# Patient Record
Sex: MALE | Race: WHITE | NOT HISPANIC OR LATINO | Employment: FULL TIME | URBAN - METROPOLITAN AREA
[De-identification: names, ages, dates, MRNs, and addresses within clinical notes are randomized per-mention and may not be internally consistent; named-entity substitution may affect disease eponyms.]

---

## 2020-11-04 ENCOUNTER — OFFICE VISIT (OUTPATIENT)
Dept: MEDICAL GROUP | Facility: MEDICAL CENTER | Age: 45
End: 2020-11-04
Attending: FAMILY MEDICINE
Payer: COMMERCIAL

## 2020-11-04 VITALS
HEIGHT: 77 IN | RESPIRATION RATE: 16 BRPM | WEIGHT: 205.5 LBS | BODY MASS INDEX: 24.26 KG/M2 | DIASTOLIC BLOOD PRESSURE: 90 MMHG | SYSTOLIC BLOOD PRESSURE: 128 MMHG | OXYGEN SATURATION: 94 % | TEMPERATURE: 97.5 F | HEART RATE: 83 BPM

## 2020-11-04 DIAGNOSIS — K50.919 CROHN'S DISEASE WITH COMPLICATION, UNSPECIFIED GASTROINTESTINAL TRACT LOCATION (HCC): ICD-10-CM

## 2020-11-04 DIAGNOSIS — I10 ESSENTIAL HYPERTENSION: ICD-10-CM

## 2020-11-04 DIAGNOSIS — Z00.00 HEALTHCARE MAINTENANCE: ICD-10-CM

## 2020-11-04 DIAGNOSIS — Z23 NEED FOR VACCINATION: ICD-10-CM

## 2020-11-04 DIAGNOSIS — F32.2 CURRENT SEVERE EPISODE OF MAJOR DEPRESSIVE DISORDER WITHOUT PSYCHOTIC FEATURES WITHOUT PRIOR EPISODE (HCC): ICD-10-CM

## 2020-11-04 PROBLEM — K50.90 CROHN'S DISEASE (HCC): Status: ACTIVE | Noted: 2020-11-04

## 2020-11-04 PROCEDURE — 99203 OFFICE O/P NEW LOW 30 MIN: CPT | Mod: 25 | Performed by: FAMILY MEDICINE

## 2020-11-04 PROCEDURE — 99204 OFFICE O/P NEW MOD 45 MIN: CPT | Performed by: FAMILY MEDICINE

## 2020-11-04 PROCEDURE — 90686 IIV4 VACC NO PRSV 0.5 ML IM: CPT

## 2020-11-04 RX ORDER — TRAZODONE HYDROCHLORIDE 50 MG/1
50 TABLET ORAL NIGHTLY PRN
Qty: 30 TAB | Refills: 3 | Status: SHIPPED | OUTPATIENT
Start: 2020-11-04 | End: 2021-06-25

## 2020-11-04 RX ORDER — HYDROCHLOROTHIAZIDE 12.5 MG/1
12.5 TABLET ORAL DAILY
Qty: 30 TAB | Refills: 3 | Status: SHIPPED | OUTPATIENT
Start: 2020-11-04 | End: 2021-06-25

## 2020-11-04 RX ORDER — HYDROCHLOROTHIAZIDE 25 MG/1
25 TABLET ORAL DAILY
COMMUNITY
End: 2020-11-04 | Stop reason: SDUPTHER

## 2020-11-04 SDOH — HEALTH STABILITY: MENTAL HEALTH: HOW OFTEN DO YOU HAVE A DRINK CONTAINING ALCOHOL?: MONTHLY OR LESS

## 2020-11-04 SDOH — HEALTH STABILITY: MENTAL HEALTH: HOW MANY STANDARD DRINKS CONTAINING ALCOHOL DO YOU HAVE ON A TYPICAL DAY?: 1 OR 2

## 2020-11-04 SDOH — HEALTH STABILITY: MENTAL HEALTH: HOW OFTEN DO YOU HAVE 6 OR MORE DRINKS ON ONE OCCASION?: NEVER

## 2020-11-04 ASSESSMENT — PATIENT HEALTH QUESTIONNAIRE - PHQ9
CLINICAL INTERPRETATION OF PHQ2 SCORE: 5
SUM OF ALL RESPONSES TO PHQ QUESTIONS 1-9: 20
5. POOR APPETITE OR OVEREATING: 3 - NEARLY EVERY DAY

## 2020-11-04 NOTE — PROGRESS NOTES
"Subjective:     CC:    Chief Complaint   Patient presents with   • Hypertension   • Medication Refill   • Establish Care       HISTORY OF THE PRESENT ILLNESS: Patient is a 45 y.o. male. This pleasant patient is here today to establish care and discuss the following issues.   No prior PCP    Essential hypertension  [facility]  On HCTZ 25mg daily  Stopped taking it 1.5 years ago, hasn't had any measurements at home  Feels amped, like his heart is pounding  No headaches, no visual changes, no dizziness      Crohn's disease (HCC)  Was taking azacol, has tried humira  Last c-scope maybe 8 years ago  Has mild flares here and there, nothing that needed hospitalization    Current severe episode of major depressive disorder without psychotic features without prior episode (HCC)  - Today's PHQ: 20   However he marked that overactivity question, but he does report that he has ADHD and very hyperactive  - Symptoms: depressed mood, anhedonia, insomnia, feelings of worthlessness/guilt and difficulty concentrating  - Suicidal ideation/homicidal ideation: will talk about \"not making it\", and admits to being half joking, but doesn't actually think about it and has no plan  - Therapy/counseling: has done therapy before, does help  - Medication - has tried paxil, which helped before. Has also tried zoloft          Allergies: Patient has no known allergies.    Current Outpatient Medications Ordered in Epic   Medication Sig Dispense Refill   • traZODone (DESYREL) 50 MG Tab Take 1 Tab by mouth at bedtime as needed for Sleep. 30 Tab 3   • hydroCHLOROthiazide (HYDRODIURIL) 12.5 MG tablet Take 1 Tab by mouth every day. 30 Tab 3     No current Epic-ordered facility-administered medications on file.        Past Medical History:   Diagnosis Date   • ADHD    • Crohn's disease (HCC)    • Hypertension    • krones        Past Surgical History:   Procedure Laterality Date   • APPENDECTOMY     • COLON RESECTION     • OTHER ABDOMINAL " "SURGERY     • OTHER ORTHOPEDIC SURGERY     • RESECTION, PARTIAL SMALL BOWEL         Social History     Tobacco Use   • Smoking status: Never Smoker   • Smokeless tobacco: Never Used   Substance Use Topics   • Alcohol use: Yes     Frequency: Monthly or less     Drinks per session: 1 or 2     Binge frequency: Never     Comment: occ    • Drug use: No       Social History     Social History Narrative   • Not on file       Family History   Problem Relation Age of Onset   • No Known Problems Mother    • No Known Problems Father    • Cancer Neg Hx    • Diabetes Neg Hx    • Hypertension Neg Hx    • Hyperlipidemia Neg Hx    • Stroke Neg Hx    • Heart Disease Neg Hx        Health Maintenance: Completed    ROS:   Gen: no fevers/chills  Eyes: needs stronger glasses  ENT: no sore throat  Pulm: no sob, no cough  CV: small chest pain twinges , no palpitations  GI: no abd pain  : no dysuria, but does sometimes have urgency, sometimes will have incomplete emptying and urinate on himself after going to the bathroom  MSk: no myalgias  Skin: no rash  Neuro: no headaches  Heme/Lymph: no easy bruising      Objective:     Exam: /90   Pulse 83   Temp 36.4 °C (97.5 °F) (Temporal)   Resp 16   Ht 1.956 m (6' 5\")   Wt 93.2 kg (205 lb 8 oz)   SpO2 94%  Body mass index is 24.37 kg/m².    General: Normal appearing. No distress.  HEENT: Normocephalic. Eyes conjunctiva clear lids without ptosis, pupils equal and reactive to light accommodation, ears normal shape and contour, canals are clear bilaterally, tympanic membranes are benign, oropharynx is without erythema, edema or exudates.   Neck: Supple. Thyroid is not enlarged.  Pulmonary: Clear to ausculation.  Normal effort. No rales, ronchi, or wheezing.  Cardiovascular: Regular rate and rhythm without murmur. Radial pulses are intact and equal bilaterally.  Abdomen: Soft, nontender, nondistended. Normal bowel sounds.  Neurologic: Grossly nonfocal  Lymph: No cervical or " supraclavicular lymph nodes are palpable  Skin: Warm and dry.  No obvious lesions.  Musculoskeletal: Normal gait. No extremity cyanosis, clubbing, or edema.  Psych: Normal mood and affect. Alert and oriented x3. Judgment and insight is normal.      Labs: none to review     Assessment & Plan:   45 y.o. male with the following -    1. Crohn's disease with complication, unspecified gastrointestinal tract location (HCC)  - REFERRAL TO GASTROENTEROLOGY  And overall seems to be well controlled.  Should establish with a GI in case any complications arise.  Should have colonoscopy for surveillance.    2. Essential hypertension  - hydroCHLOROthiazide (HYDRODIURIL) 12.5 MG tablet; Take 1 Tab by mouth every day.  Dispense: 30 Tab; Refill: 3  His blood pressure is not terribly high, may come down with depression and anxiety medications.  However given his history, will start on low-dose HCTZ.  Patient to get a blood pressure cuff and will monitor at home    3. Current severe episode of major depressive disorder without psychotic features without prior episode (HCC)  - traZODone (DESYREL) 50 MG Tab; Take 1 Tab by mouth at bedtime as needed for Sleep.  Dispense: 30 Tab; Refill: 3  Moderate to severe depression, he is interested in having something that might help him sleep.  I counseled him on potential side effects and he expressed understanding.  I have advised him to use the silver there are some mental health services to try and get therapy and also see a psychiatrist evaluate for ADHD    4. Healthcare maintenance  - CBC WITH DIFFERENTIAL; Future  - Comp Metabolic Panel; Future  - HEMOGLOBIN A1C; Future  - Lipid Profile; Future    5. Need for vaccination  - Influenza Vaccine Quad Injection (PF)      Return in about 1 month (around 12/4/2020).    Please note that this dictation was created using voice recognition software. I have made every reasonable attempt to correct obvious errors, but I expect that there are errors of  grammar and possibly content that I did not discover before finalizing the note.

## 2020-11-04 NOTE — PROGRESS NOTES
Subjective:     CC:    Chief Complaint   Patient presents with   • Hypertension   • Medication Refill   • Establish Care       HISTORY OF THE PRESENT ILLNESS: Patient is a 45 y.o. male. This pleasant patient is here today to establish care and discuss the following issues.   His/her prior PCP was ***.    No problem-specific Assessment & Plan notes found for this encounter.    Pt is worried about learning disability    Allergies: Patient has no known allergies.    Current Outpatient Medications Ordered in Epic   Medication Sig Dispense Refill   • hydroCHLOROthiazide (HYDRODIURIL) 25 MG Tab Take 25 mg by mouth every day.     • HYDROCODONE-ACETAMINOPHEN 5-500 MG PO TABS Take 1-2 Tabs by mouth every 6 hours as needed for Mild Pain. 20 Each 0   • AMOXICILLIN-POT CLAVULANATE 875-125 MG PO TABS Take 1 Tab by mouth 2 times a day. 20 Each 0   • SULFAMETHOXAZOLE-TRIMETHOPRIM 800-160 MG PO TABS Take 1 Tab by mouth 2 times a day. 20 Tab 0   • HYDROCODONE-ACETAMINOPHEN 5-500 MG PO TABS Take 1-2 Tabs by mouth every four hours as needed for Mild Pain. 10 Each 0     No current [facility]-ordered facility-administered medications on file.        Past Medical History:   Diagnosis Date   • ADHD    • Crohn's disease (HCC)    • Hypertension    • krones        Past Surgical History:   Procedure Laterality Date   • APPENDECTOMY     • COLON RESECTION     • OTHER ABDOMINAL SURGERY     • OTHER ORTHOPEDIC SURGERY     • RESECTION, PARTIAL SMALL BOWEL         Social History     Tobacco Use   • Smoking status: Never Smoker   • Smokeless tobacco: Never Used   Substance Use Topics   • Alcohol use: Yes     Frequency: Monthly or less     Drinks per session: 1 or 2     Binge frequency: Never     Comment: occ    • Drug use: No       Social History     Social History Narrative   • Not on file       Family History   Problem Relation Age of Onset   • No Known Problems Mother    • No Known Problems Father    • Cancer Neg Hx    • Diabetes Neg Hx    •  "Hypertension Neg Hx    • Hyperlipidemia Neg Hx    • Stroke Neg Hx    • Heart Disease Neg Hx        Health Maintenance: {COMPLETED:257015}    ROS:   Gen: no fevers/chills  Eyes: no changes in vision  ENT: no sore throat  Pulm: no sob, no cough  CV: no chest pain, no palpitations  GI: no abd pain  : no dysuria, sometimes with urgency and incomplete emptying  MSk: no myalgias  Skin: no rash  Neuro: no headaches  Heme/Lymph: no easy bruising      Objective:   ***  Exam: /88 (BP Location: Left arm, Patient Position: Sitting, BP Cuff Size: Adult long)   Pulse 83   Temp 36.4 °C (97.5 °F) (Temporal)   Resp 16   Ht 1.956 m (6' 5\")   Wt 93.2 kg (205 lb 8 oz)   SpO2 94%  Body mass index is 24.37 kg/m².    General: Normal appearing. No distress.  HEENT: Normocephalic. Eyes conjunctiva clear lids without ptosis, pupils equal and reactive to light accommodation, ears normal shape and contour, canals are clear bilaterally, tympanic membranes are benign, nasal mucosa benign, oropharynx is without erythema, edema or exudates.   Neck: Supple. Thyroid is not enlarged.  Pulmonary: Clear to ausculation.  Normal effort. No rales, ronchi, or wheezing.  Cardiovascular: Regular rate and rhythm without murmur. Radial pulses are intact and equal bilaterally.  Abdomen: Soft, nontender, nondistended. Normal bowel sounds.  Neurologic: Grossly nonfocal  Lymph: No cervical or supraclavicular lymph nodes are palpable  Skin: Warm and dry.  No obvious lesions.  Musculoskeletal: Normal gait. No extremity cyanosis, clubbing, or edema.  Psych: Normal mood and affect. Alert and oriented x3. Judgment and insight is normal.  ***    Labs: ***    Assessment & Plan:   45 y.o. male with the following -    1. Crohn's disease with complication, unspecified gastrointestinal tract location (HCC)    2. Essential hypertension    Other orders  - hydroCHLOROthiazide (HYDRODIURIL) 25 MG Tab; Take 25 mg by mouth every day.      No follow-ups on " file.    Please note that this dictation was created using voice recognition software. I have made every reasonable attempt to correct obvious errors, but I expect that there are errors of grammar and possibly content that I did not discover before finalizing the note.

## 2020-11-04 NOTE — ASSESSMENT & PLAN NOTE
Was taking azacol, has tried humira  Last c-scope maybe 8 years ago  Has mild flares here and there, nothing that needed hospitalization

## 2020-11-04 NOTE — ASSESSMENT & PLAN NOTE
[facility] in [facility]  On HCTZ 25mg daily  Stopped taking it 1.5 years ago, hasn't had any measurements at home  Feels amped, like his heart is pounding  No headaches, no visual changes, no dizziness

## 2020-11-04 NOTE — ASSESSMENT & PLAN NOTE
"- Today's PHQ: 20   However he marked that overactivity question, but he does report that he has ADHD and very hyperactive  - Symptoms: depressed mood, anhedonia, insomnia, feelings of worthlessness/guilt and difficulty concentrating  - Suicidal ideation/homicidal ideation: will talk about \"not making it\", and admits to being half joking, but doesn't actually think about it and has no plan  - Therapy/counseling: has done therapy before, does help  - Medication - has tried paxil, which helped before. Has also tried zoloft      "

## 2021-07-16 ENCOUNTER — OFFICE VISIT (OUTPATIENT)
Dept: MEDICAL GROUP | Facility: MEDICAL CENTER | Age: 46
End: 2021-07-16
Attending: FAMILY MEDICINE
Payer: COMMERCIAL

## 2021-07-16 VITALS
TEMPERATURE: 97.2 F | BODY MASS INDEX: 25.74 KG/M2 | DIASTOLIC BLOOD PRESSURE: 88 MMHG | HEIGHT: 75 IN | RESPIRATION RATE: 20 BRPM | OXYGEN SATURATION: 94 % | SYSTOLIC BLOOD PRESSURE: 142 MMHG | HEART RATE: 78 BPM | WEIGHT: 207 LBS

## 2021-07-16 DIAGNOSIS — I10 ESSENTIAL HYPERTENSION: ICD-10-CM

## 2021-07-16 DIAGNOSIS — F32.2 CURRENT SEVERE EPISODE OF MAJOR DEPRESSIVE DISORDER WITHOUT PSYCHOTIC FEATURES WITHOUT PRIOR EPISODE (HCC): ICD-10-CM

## 2021-07-16 DIAGNOSIS — K50.919 CROHN'S DISEASE WITH COMPLICATION, UNSPECIFIED GASTROINTESTINAL TRACT LOCATION (HCC): ICD-10-CM

## 2021-07-16 PROCEDURE — 99214 OFFICE O/P EST MOD 30 MIN: CPT | Performed by: FAMILY MEDICINE

## 2021-07-16 PROCEDURE — 99213 OFFICE O/P EST LOW 20 MIN: CPT | Performed by: FAMILY MEDICINE

## 2021-07-16 RX ORDER — HYDROCHLOROTHIAZIDE 25 MG/1
25 TABLET ORAL DAILY
Qty: 30 TABLET | Refills: 2 | Status: SHIPPED | OUTPATIENT
Start: 2021-07-16 | End: 2021-12-13

## 2021-07-16 RX ORDER — TRAZODONE HYDROCHLORIDE 100 MG/1
100 TABLET ORAL
Qty: 30 TABLET | Refills: 2 | Status: SHIPPED | OUTPATIENT
Start: 2021-07-16 | End: 2021-08-20 | Stop reason: SDUPTHER

## 2021-07-16 ASSESSMENT — PATIENT HEALTH QUESTIONNAIRE - PHQ9
5. POOR APPETITE OR OVEREATING: 0 - NOT AT ALL
CLINICAL INTERPRETATION OF PHQ2 SCORE: 5
SUM OF ALL RESPONSES TO PHQ QUESTIONS 1-9: 15

## 2021-07-16 NOTE — PROGRESS NOTES
Subjective:     CC:   Chief Complaint   Patient presents with   • Follow-Up     depression         HPI:     Crohn's disease (HCC)  Has not set up care with GI.     Essential hypertension  BP mildly elevated today  He states there has been increased stressors at home.   Not taking his medications regularly but he does state he took it last 2 days     Current severe episode of major depressive disorder without psychotic features without prior episode (HCC)  - Medications: trazodone 50mg - he reports it helped slightly, get slightly more sleep  - Today's PHQ: 15 Last PHQ: 20  - Symptoms: insomnia  - Suicidal ideation/homicidal ideation: increased stressors, he does have thoughts but no plan, and he feels like he is at low likelihood of doing anything   - Therapy/counseling: never established   - Improving/worsening: worse due to home stressors    Depression Screening    Little interest or pleasure in doing things?  2 - more than half the days   Feeling down, depressed , or hopeless? 3 - nearly every day   Trouble falling or staying asleep, or sleeping too much?  3 - nearly every day   Feeling tired or having little energy?  2 - more than half the days   Poor appetite or overeating?  0 - not at all   Feeling bad about yourself - or that you are a failure or have let yourself or your family down? 3 - nearly every day   Trouble concentrating on things, such as reading the newspaper or watching television? 0 - not at all   Moving or speaking so slowly that other people could have noticed.  Or the opposite - being so fidgety or restless that you have been moving around a lot more than usual?  0 - not at all   Thoughts that you would be better off dead, or of hurting yourself?  2 - more than half the days   Patient Health Questionnaire Score: 15       If depressive symptoms identified deferred to follow up visit unless specifically addressed in assesment and plan.    Interpretation of PHQ-9 Total Score   Score Severity   1-4  "No Depression   5-9 Mild Depression   10-14 Moderate Depression   15-19 Moderately Severe Depression   20-27 Severe Depression          Past Medical History:   Diagnosis Date   • ADHD    • Crohn's disease (HCC)    • Hypertension    • krones        Social History     Tobacco Use   • Smoking status: Never Smoker   • Smokeless tobacco: Never Used   Vaping Use   • Vaping Use: Never used   Substance Use Topics   • Alcohol use: Yes     Comment: occ    • Drug use: No       Current Outpatient Medications Ordered in Epic   Medication Sig Dispense Refill   • traZODone (DESYREL) 100 MG Tab Take 1 tablet by mouth at bedtime. 30 tablet 2   • hydroCHLOROthiazide (HYDRODIURIL) 25 MG Tab Take 1 tablet by mouth every day. 30 tablet 2     No current Epic-ordered facility-administered medications on file.       Allergies:  Patient has no known allergies.    ROS:  Pulm: no sob, no cough  CV: no chest pain    Objective:       Exam:  /88 (BP Location: Left arm, Patient Position: Sitting, BP Cuff Size: Adult)   Pulse 78   Temp 36.2 °C (97.2 °F) (Temporal)   Resp 20   Ht 1.905 m (6' 3\")   Wt 93.9 kg (207 lb)   SpO2 94%   BMI 25.87 kg/m²  Body mass index is 25.87 kg/m².   BP recheck by me - 138/90    Gen: No apparent distress.  Lungs: Normal effort  CV: Regular rate and rhythm  Derm: Warm and dry. No visible rashes  Psy: Alert and oriented, Anxious mood and affect. Judgment normal      Labs:   None completed    Assessment & Plan:     46 y.o. male with the following -     1. Essential hypertension  - hydroCHLOROthiazide (HYDRODIURIL) 25 MG Tab; Take 1 tablet by mouth every day.  Dispense: 30 tablet; Refill: 2  Blood pressure is still mildly elevated, patient is not taking his blood pressure medications regularly, I did  him on doing this, however he did state that he was taking his meds for the last few days.  Given his still elevated blood pressure, will increase his dose to 25 mg.  He does state that he was previously " on this dose and worked well for him.    2. Current severe episode of major depressive disorder without psychotic features without prior episode (HCC)  - traZODone (DESYREL) 100 MG Tab; Take 1 tablet by mouth at bedtime.  Dispense: 30 tablet; Refill: 2  Patient states that he has been out for a few weeks now.  Am increasing his trazodone to 100 mg as he states he still having a difficult time sleeping even when he is on the medication.  He does state that it has been helping slightly with his mood.  [facility] referrals card was given to patient so that he can establish with talk therapy and/or psychiatry.    3. Crohn's disease with complication, unspecified gastrointestinal tract location (HCC)   again encourage patient to complete labs and Follow-up with gastroenterology.        Return in about 1 month (around 8/16/2021) for f/u depression, htn.    Please note that this dictation was created using voice recognition software. I have made every reasonable attempt to correct obvious errors, but I expect that there are errors of grammar and possibly content that I did not discover before finalizing the note.

## 2021-07-16 NOTE — ASSESSMENT & PLAN NOTE
BP mildly elevated today  He states there has been increased stressors at home.   Not taking his medications regularly but he does state he took it last 2 days

## 2021-07-16 NOTE — PATIENT INSTRUCTIONS
TO DO:  - complete labs  - call referral to gastroenterologist:  [facility]   [address] [facility] [place] [address]  Phone: [phone]  - call [facility] mental health referrals to get set up with a counselor

## 2021-07-16 NOTE — ASSESSMENT & PLAN NOTE
- Medications: trazodone 50mg - he reports it helped slightly, get slightly more sleep  - Today's PHQ: 15 Last PHQ: 20  - Symptoms: insomnia  - Suicidal ideation/homicidal ideation: increased stressors, he does have thoughts but no plan, and he feels like he is at low likelihood of doing anything   - Therapy/counseling: never established   - Improving/worsening: worse due to home stressors    Depression Screening    Little interest or pleasure in doing things?  2 - more than half the days   Feeling down, depressed , or hopeless? 3 - nearly every day   Trouble falling or staying asleep, or sleeping too much?  3 - nearly every day   Feeling tired or having little energy?  2 - more than half the days   Poor appetite or overeating?  0 - not at all   Feeling bad about yourself - or that you are a failure or have let yourself or your family down? 3 - nearly every day   Trouble concentrating on things, such as reading the newspaper or watching television? 0 - not at all   Moving or speaking so slowly that other people could have noticed.  Or the opposite - being so fidgety or restless that you have been moving around a lot more than usual?  0 - not at all   Thoughts that you would be better off dead, or of hurting yourself?  2 - more than half the days   Patient Health Questionnaire Score: 15       If depressive symptoms identified deferred to follow up visit unless specifically addressed in assesment and plan.    Interpretation of PHQ-9 Total Score   Score Severity   1-4 No Depression   5-9 Mild Depression   10-14 Moderate Depression   15-19 Moderately Severe Depression   20-27 Severe Depression

## 2021-08-20 ENCOUNTER — OFFICE VISIT (OUTPATIENT)
Dept: MEDICAL GROUP | Facility: MEDICAL CENTER | Age: 46
End: 2021-08-20
Attending: FAMILY MEDICINE
Payer: COMMERCIAL

## 2021-08-20 VITALS
DIASTOLIC BLOOD PRESSURE: 90 MMHG | SYSTOLIC BLOOD PRESSURE: 122 MMHG | HEART RATE: 102 BPM | BODY MASS INDEX: 25.57 KG/M2 | RESPIRATION RATE: 20 BRPM | OXYGEN SATURATION: 92 % | WEIGHT: 205.7 LBS | HEIGHT: 75 IN | TEMPERATURE: 98 F

## 2021-08-20 DIAGNOSIS — F32.2 CURRENT SEVERE EPISODE OF MAJOR DEPRESSIVE DISORDER WITHOUT PSYCHOTIC FEATURES WITHOUT PRIOR EPISODE (HCC): ICD-10-CM

## 2021-08-20 DIAGNOSIS — F41.1 GAD (GENERALIZED ANXIETY DISORDER): ICD-10-CM

## 2021-08-20 DIAGNOSIS — I10 ESSENTIAL HYPERTENSION: ICD-10-CM

## 2021-08-20 PROCEDURE — 99214 OFFICE O/P EST MOD 30 MIN: CPT | Performed by: FAMILY MEDICINE

## 2021-08-20 PROCEDURE — 99213 OFFICE O/P EST LOW 20 MIN: CPT | Performed by: FAMILY MEDICINE

## 2021-08-20 RX ORDER — TRAZODONE HYDROCHLORIDE 150 MG/1
150 TABLET ORAL
Qty: 30 TABLET | Refills: 3 | Status: SHIPPED | OUTPATIENT
Start: 2021-08-20 | End: 2022-04-12

## 2021-08-20 RX ORDER — PAROXETINE HYDROCHLORIDE 20 MG/1
20 TABLET, FILM COATED ORAL DAILY
Qty: 30 TABLET | Refills: 3 | Status: SHIPPED | OUTPATIENT
Start: 2021-08-20 | End: 2021-11-23

## 2021-08-20 RX ORDER — BUSPIRONE HYDROCHLORIDE 10 MG/1
TABLET ORAL
Qty: 60 TABLET | Refills: 3 | Status: SHIPPED | OUTPATIENT
Start: 2021-08-20 | End: 2021-11-23

## 2021-08-20 ASSESSMENT — ANXIETY QUESTIONNAIRES
7. FEELING AFRAID AS IF SOMETHING AWFUL MIGHT HAPPEN: NEARLY EVERY DAY
3. WORRYING TOO MUCH ABOUT DIFFERENT THINGS: NEARLY EVERY DAY
5. BEING SO RESTLESS THAT IT IS HARD TO SIT STILL: NEARLY EVERY DAY
6. BECOMING EASILY ANNOYED OR IRRITABLE: NEARLY EVERY DAY
2. NOT BEING ABLE TO STOP OR CONTROL WORRYING: NEARLY EVERY DAY
4. TROUBLE RELAXING: NEARLY EVERY DAY
1. FEELING NERVOUS, ANXIOUS, OR ON EDGE: NEARLY EVERY DAY
GAD7 TOTAL SCORE: 21

## 2021-08-20 ASSESSMENT — PATIENT HEALTH QUESTIONNAIRE - PHQ9
CLINICAL INTERPRETATION OF PHQ2 SCORE: 4
5. POOR APPETITE OR OVEREATING: 2 - MORE THAN HALF THE DAYS
SUM OF ALL RESPONSES TO PHQ QUESTIONS 1-9: 19

## 2021-08-20 NOTE — ASSESSMENT & PLAN NOTE
Anxiety has been making his bps higher, doesn't always check at home but does not normal blood pressures when he is calmer

## 2021-08-20 NOTE — ASSESSMENT & PLAN NOTE
Maybe zoloft, celexa, paxil in the past? He thinks he did well on paroxetine before   He states he is doing better overall, is having more good days than he did previously     Last phq9 = 15    Depression Screening    Little interest or pleasure in doing things?  2 - more than half the days   Feeling down, depressed , or hopeless? 2 - more than half the days   Trouble falling or staying asleep, or sleeping too much?  3 - nearly every day   Feeling tired or having little energy?  3 - nearly every day   Poor appetite or overeating?  2 - more than half the days   Feeling bad about yourself - or that you are a failure or have let yourself or your family down? 2 - more than half the days   Trouble concentrating on things, such as reading the newspaper or watching television? 2 - more than half the days   Moving or speaking so slowly that other people could have noticed.  Or the opposite - being so fidgety or restless that you have been moving around a lot more than usual?  3 - nearly every day   Thoughts that you would be better off dead, or of hurting yourself?  0 - not at all   Patient Health Questionnaire Score: 19       If depressive symptoms identified deferred to follow up visit unless specifically addressed in assesment and plan.    Interpretation of PHQ-9 Total Score   Score Severity   1-4 No Depression   5-9 Mild Depression   10-14 Moderate Depression   15-19 Moderately Severe Depression   20-27 Severe Depression

## 2021-08-20 NOTE — PROGRESS NOTES
Subjective:     CC:   Chief Complaint   Patient presents with   • Follow-Up     depression and BP         HPI:     Current severe episode of major depressive disorder without psychotic features without prior episode (HCC)  Maybe zoloft, celexa, paxil in the past? He thinks he did well on paroxetine before   He states he is doing better overall, is having more good days than he did previously     Last phq9 = 15    Depression Screening    Little interest or pleasure in doing things?  2 - more than half the days   Feeling down, depressed , or hopeless? 2 - more than half the days   Trouble falling or staying asleep, or sleeping too much?  3 - nearly every day   Feeling tired or having little energy?  3 - nearly every day   Poor appetite or overeating?  2 - more than half the days   Feeling bad about yourself - or that you are a failure or have let yourself or your family down? 2 - more than half the days   Trouble concentrating on things, such as reading the newspaper or watching television? 2 - more than half the days   Moving or speaking so slowly that other people could have noticed.  Or the opposite - being so fidgety or restless that you have been moving around a lot more than usual?  3 - nearly every day   Thoughts that you would be better off dead, or of hurting yourself?  0 - not at all   Patient Health Questionnaire Score: 19       If depressive symptoms identified deferred to follow up visit unless specifically addressed in assesment and plan.    Interpretation of PHQ-9 Total Score   Score Severity   1-4 No Depression   5-9 Mild Depression   10-14 Moderate Depression   15-19 Moderately Severe Depression   20-27 Severe Depression        SHEY (generalized anxiety disorder)  Severe anxiety  Trazodone didn't help too much, but he states he might be feeling slightly better. He states his mind races every night and can't fall asleep.  He works as a  but states that he doesn't feel like his work really  "affects his anxiety and just keeps him busy. Denies any traumatic experiences that have severely affected him, does not have flashbacks.     SHEY-7 Questionnaire    Feeling nervous, anxious, or on edge: Nearly every day  Not being able to sop or control worrying: Nearly every day  Worrying too much about different things: Nearly every day  Trouble relaxing: Nearly every day  Being so restless that it's hard to sit still: Nearly every day  Becoming easily annoyed or irritable: Nearly every day  Feeling afraid as if something awful might happen: Nearly every day  Total: 21    Interpretation of SHEY 7 Total Score   Score Severity :  0-4 No Anxiety   5-9 Mild Anxiety  10-14 Moderate Anxiety  15-21 Severe Anxiety        Essential hypertension  Anxiety has been making his bps higher, doesn't always check at home but does not normal blood pressures when he is calmer      Past Medical History:   Diagnosis Date   • ADHD    • Crohn's disease (HCC)    • Hypertension    • krones        Social History     Tobacco Use   • Smoking status: Never Smoker   • Smokeless tobacco: Never Used   Vaping Use   • Vaping Use: Never used   Substance Use Topics   • Alcohol use: Yes     Comment: occ    • Drug use: No       Current Outpatient Medications Ordered in Epic   Medication Sig Dispense Refill   • PARoxetine (PAXIL) 20 MG Tab Take 1 Tablet by mouth every day. 30 Tablet 3   • busPIRone (BUSPAR) 10 MG Tab tablet Start with 1/2 tab twice daily for 1 week, then increase to 1 tab twice daily 60 Tablet 3   • traZODone (DESYREL) 150 MG Tab Take 1 Tablet by mouth at bedtime. 30 Tablet 3   • hydroCHLOROthiazide (HYDRODIURIL) 25 MG Tab Take 1 tablet by mouth every day. 30 tablet 2     No current Epic-ordered facility-administered medications on file.       Allergies:  Patient has no known allergies.        Objective:       Exam:  /90   Pulse (!) 102   Temp 36.7 °C (98 °F) (Temporal)   Resp 20   Ht 1.905 m (6' 3\")   Wt 93.3 kg (205 lb 11.2 " oz)   SpO2 92%   BMI 25.71 kg/m²  Body mass index is 25.71 kg/m².    Constitutional: Alert, no distress, well-groomed. Hyperactive  Respiratory: Unlabored respiratory effort, no cough.  MSK: moves all extremities.  Psych: Alert and oriented x3, anxious affect and mood.          Labs:   None recent    Assessment & Plan:     46 y.o. male with the following -     1. Current severe episode of major depressive disorder without psychotic features without prior episode (HCC)  - PARoxetine (PAXIL) 20 MG Tab; Take 1 Tablet by mouth every day.  Dispense: 30 Tablet; Refill: 3  - busPIRone (BUSPAR) 10 MG Tab tablet; Start with 1/2 tab twice daily for 1 week, then increase to 1 tab twice daily  Dispense: 60 Tablet; Refill: 3  - traZODone (DESYREL) 150 MG Tab; Take 1 Tablet by mouth at bedtime.  Dispense: 30 Tablet; Refill: 3  Start on paxil, buspar, increase trazodone to 150mg.     2. SHEY (generalized anxiety disorder)  - PARoxetine (PAXIL) 20 MG Tab; Take 1 Tablet by mouth every day.  Dispense: 30 Tablet; Refill: 3  - busPIRone (BUSPAR) 10 MG Tab tablet; Start with 1/2 tab twice daily for 1 week, then increase to 1 tab twice daily  Dispense: 60 Tablet; Refill: 3  meds as above. He does report some history of ADHD - he will get eval'd by a psychiatrist who might initiate therapy. Given his pattern of speech and behavior, he may very well have adhd  And his treatment may improve his anxiety/depression. In the meantime starting on ssri and adjunctive anxiety meds. He is appreciative and willing to try something to feel better    3. Essential hypertension  bp improved during recheck, diastolic a little on the high side. Continue working on anxiety - if still high may need to add on additional meds.     Return in about 6 weeks (around 10/1/2021) for f/u depression/anxiety, blood pressure.    Please note that this dictation was created using voice recognition software. I have made every reasonable attempt to correct obvious errors,  but I expect that there are errors of grammar and possibly content that I did not discover before finalizing the note.

## 2021-08-20 NOTE — ASSESSMENT & PLAN NOTE
Severe anxiety  Trazodone didn't help too much, but he states he might be feeling slightly better. He states his mind races every night and can't fall asleep.  He works as a  but states that he doesn't feel like his work really affects his anxiety and just keeps him busy. Denies any traumatic experiences that have severely affected him, does not have flashbacks.     SHEY-7 Questionnaire    Feeling nervous, anxious, or on edge: Nearly every day  Not being able to sop or control worrying: Nearly every day  Worrying too much about different things: Nearly every day  Trouble relaxing: Nearly every day  Being so restless that it's hard to sit still: Nearly every day  Becoming easily annoyed or irritable: Nearly every day  Feeling afraid as if something awful might happen: Nearly every day  Total: 21    Interpretation of SHEY 7 Total Score   Score Severity :  0-4 No Anxiety   5-9 Mild Anxiety  10-14 Moderate Anxiety  15-21 Severe Anxiety

## 2021-11-09 ENCOUNTER — TELEPHONE (OUTPATIENT)
Dept: MEDICAL GROUP | Facility: MEDICAL CENTER | Age: 46
End: 2021-11-09

## 2021-11-09 DIAGNOSIS — U07.1 COVID-19: ICD-10-CM

## 2021-11-09 NOTE — TELEPHONE ENCOUNTER
1. Name: Mason Craig      Call Back Number: [phone]        How would the patient prefer to be contacted with a response: Phone call OK to leave a detailed message    2. Patient is requesting orders for Covid Test    3. Clinical Reason for Request: covid symptoms     4. Specialty & Provider Name/Lab/Imaging Location Preference: [facility] doing covid testing    5. Is appointment scheduled for requested order/referral: no    Patient was informed they may receive a return phone call from our office with any additional questions before processing this request.    Patient stated he took an at home Covid test 20 days ago and it came back positve. He tested again with the at home test and was still positive. Patient is now feeling better and only has fatigue but would like an order for a lab test instead. Please call patient for further questions.

## 2021-11-10 NOTE — TELEPHONE ENCOUNTER
Tried to reach patient but he does not have a voicemail on his phone.  I will order a antibody test which should be able to confirm that he likely did recently have a Covid infection.  The persistence of Covid virus on the nasal swab does not equal infectivity and if he has been 20 days now since he came down sick and at least 10 days since he had any fever he is not likely to be infectious and can and any ongoing self quarantine.  The antibody test can confirm for his peace of mind that he did have Covid and is developing separate Covid immunity on top of what he can get from receiving a Covid vaccine.  Lab order is sent to [facility].

## 2021-11-10 NOTE — TELEPHONE ENCOUNTER
Called patient and informed him the antibody Covid test was ordered and all he needs to do is make an appointment at the labs that are doing the test. Patient voiced understanding.

## 2021-11-23 ENCOUNTER — OFFICE VISIT (OUTPATIENT)
Dept: MEDICAL GROUP | Facility: MEDICAL CENTER | Age: 46
End: 2021-11-23
Attending: NURSE PRACTITIONER
Payer: COMMERCIAL

## 2021-11-23 VITALS
RESPIRATION RATE: 16 BRPM | OXYGEN SATURATION: 91 % | HEIGHT: 76 IN | DIASTOLIC BLOOD PRESSURE: 98 MMHG | WEIGHT: 207 LBS | BODY MASS INDEX: 25.21 KG/M2 | SYSTOLIC BLOOD PRESSURE: 154 MMHG | HEART RATE: 103 BPM | TEMPERATURE: 97.3 F

## 2021-11-23 DIAGNOSIS — F41.1 GAD (GENERALIZED ANXIETY DISORDER): ICD-10-CM

## 2021-11-23 DIAGNOSIS — I10 ESSENTIAL HYPERTENSION: ICD-10-CM

## 2021-11-23 DIAGNOSIS — F32.2 CURRENT SEVERE EPISODE OF MAJOR DEPRESSIVE DISORDER WITHOUT PSYCHOTIC FEATURES WITHOUT PRIOR EPISODE (HCC): ICD-10-CM

## 2021-11-23 PROCEDURE — 99212 OFFICE O/P EST SF 10 MIN: CPT | Performed by: NURSE PRACTITIONER

## 2021-11-23 PROCEDURE — 99214 OFFICE O/P EST MOD 30 MIN: CPT | Performed by: NURSE PRACTITIONER

## 2021-11-23 RX ORDER — PAROXETINE HYDROCHLORIDE 20 MG/1
30 TABLET, FILM COATED ORAL DAILY
Qty: 30 TABLET | Refills: 3 | Status: SHIPPED | OUTPATIENT
Start: 2021-11-23 | End: 2022-04-12

## 2021-11-23 RX ORDER — BUSPIRONE HYDROCHLORIDE 10 MG/1
10 TABLET ORAL 2 TIMES DAILY
Qty: 90 TABLET | Refills: 1 | Status: SHIPPED | OUTPATIENT
Start: 2021-11-23 | End: 2022-12-05 | Stop reason: SDUPTHER

## 2021-11-24 NOTE — PROGRESS NOTES
Chief Complaint   Patient presents with   • Follow-Up       Subjective:     HPI:   Mason Craig is a 46 y.o. male here to discuss the evaluation and management of:      Problem   Shey (Generalized Anxiety Disorder)    Patient states medications are not helping with his anxiety and he has trouble focusing or sitting still. He did not remember why he had an appointment today. States he has been taking the Paxil but never picked up the buspar.          ROS  See HPI     No Known Allergies    Current medicines (including changes today)  Current Outpatient Medications   Medication Sig Dispense Refill   • PARoxetine (PAXIL) 20 MG Tab Take 1.5 Tablets by mouth every day. 30 Tablet 3   • busPIRone (BUSPAR) 10 MG Tab tablet Take 1 Tablet by mouth 2 times a day. Start with 1/2 tab twice daily for 1 week, then increase to 1 tab twice daily 90 Tablet 1   • traZODone (DESYREL) 150 MG Tab Take 1 Tablet by mouth at bedtime. 30 Tablet 3   • hydroCHLOROthiazide (HYDRODIURIL) 25 MG Tab Take 1 tablet by mouth every day. 30 tablet 2     No current facility-administered medications for this visit.       Social History     Tobacco Use   • Smoking status: Never Smoker   • Smokeless tobacco: Never Used   Vaping Use   • Vaping Use: Never used   Substance Use Topics   • Alcohol use: Yes     Comment: occ    • Drug use: No       Patient Active Problem List    Diagnosis Date Noted   • SHEY (generalized anxiety disorder) 08/20/2021   • Crohn's disease (HCC) 11/04/2020   • Essential hypertension 11/04/2020   • Current severe episode of major depressive disorder without psychotic features without prior episode (HCC) 11/04/2020       Family History   Problem Relation Age of Onset   • No Known Problems Mother    • No Known Problems Father    • Cancer Neg Hx    • Diabetes Neg Hx    • Hypertension Neg Hx    • Hyperlipidemia Neg Hx    • Stroke Neg Hx    • Heart Disease Neg Hx           Objective:     /98 (BP Location: Left arm, Patient Position:  "Sitting, BP Cuff Size: Adult)   Pulse (!) 103   Temp 36.3 °C (97.3 °F) (Temporal)   Resp 16   Ht 1.93 m (6' 4\")   Wt 93.9 kg (207 lb)   SpO2 91%  Body mass index is 25.2 kg/m².    Physical Exam:  Physical Exam  Vitals reviewed.   Constitutional:       General: He is awake.      Appearance: He is overweight.      Comments: Fidgeting continuously unable to sit still    HENT:      Head: Normocephalic.   Eyes:      Conjunctiva/sclera: Conjunctivae normal.   Cardiovascular:      Rate and Rhythm: Regular rhythm. Tachycardia present.      Heart sounds: Normal heart sounds.   Pulmonary:      Effort: Pulmonary effort is normal. No respiratory distress.      Breath sounds: Normal breath sounds.   Musculoskeletal:      Cervical back: Neck supple.   Skin:     General: Skin is warm and dry.   Neurological:      Mental Status: He is alert and oriented to person, place, and time.   Psychiatric:         Attention and Perception: He is inattentive.         Mood and Affect: Mood is anxious.         Speech: Speech is rapid and pressured.         Behavior: Behavior is hyperactive. Behavior is cooperative.         Judgment: Judgment is impulsive.              Assessment and Plan:     The following treatment plan was discussed:    Problem List Items Addressed This Visit     Essential hypertension    Current severe episode of major depressive disorder without psychotic features without prior episode (HCC)    Relevant Medications    PARoxetine (PAXIL) 20 MG Tab    busPIRone (BUSPAR) 10 MG Tab tablet    SHEY (generalized anxiety disorder)     Patient visibly anxious/ manic, speech rapid and pressured, he is unable to sit still  Referral to Psychiatry to help with anxiety   Increased Paxil to 30 mg   Re-ordered buspar for patient to initiate   Follow up in 6 weeks            Relevant Medications    PARoxetine (PAXIL) 20 MG Tab    busPIRone (BUSPAR) 10 MG Tab tablet    Other Relevant Orders    Referral to Psychiatry          Any change " or worsening of signs or symptoms, patient encouraged to follow-up or report to emergency room for further evaluation. Patient verbalizes understanding and agrees.    Follow-Up: Return in about 6 weeks (around 1/4/2022).      PLEASE NOTE: This dictation was created using voice recognition software. I have made every reasonable attempt to correct obvious errors, but I expect that there are errors of grammar and possibly content that I did not discover before finalizing the note.

## 2021-11-24 NOTE — ASSESSMENT & PLAN NOTE
Patient visibly anxious/ manic, speech rapid and pressured, he is unable to sit still  Referral to Psychiatry to help with anxiety   Increased Paxil to 30 mg   Re-ordered buspar for patient to initiate   Follow up in 6 weeks

## 2021-12-12 DIAGNOSIS — I10 ESSENTIAL HYPERTENSION: ICD-10-CM

## 2021-12-13 RX ORDER — HYDROCHLOROTHIAZIDE 25 MG/1
TABLET ORAL
Qty: 30 TABLET | Refills: 0 | Status: SHIPPED | OUTPATIENT
Start: 2021-12-13 | End: 2022-02-01

## 2021-12-14 DIAGNOSIS — F32.2 CURRENT SEVERE EPISODE OF MAJOR DEPRESSIVE DISORDER WITHOUT PSYCHOTIC FEATURES WITHOUT PRIOR EPISODE (HCC): ICD-10-CM

## 2021-12-15 ENCOUNTER — PATIENT MESSAGE (OUTPATIENT)
Dept: MEDICAL GROUP | Facility: MEDICAL CENTER | Age: 46
End: 2021-12-15

## 2021-12-15 RX ORDER — TRAZODONE HYDROCHLORIDE 150 MG/1
150 TABLET ORAL
Qty: 30 TABLET | Refills: 3 | OUTPATIENT
Start: 2021-12-15

## 2021-12-15 NOTE — TELEPHONE ENCOUNTER
Group Topic: [place]    Date: 8/13/2020  Start Time: 10:10 AM  End Time: 11:00 AM  Facilitators: Alisha Pittman OT    Focus: Pt. able to draw self as a tree and discuss how trees relate to life situations currently  Number in attendance: 9    Method: Group  Attendance: Present  Participation: Active  Patient Response: Appropriate feedback, Attentive and Verbal  Mood: Anxious and Depressed  Affect: Type: Anxious   Range: Restricted   Congruency: Congruent   Stability: Stable  Behavior/Socialization: Appropriate to group, Cooperative and Supportive  Thought Process: Demonstrated insight, Easily distracted, Racing thoughts and Ruminating  Task Performance: Follows directions and Needs clarification  Patient Evaluation: Independent - full participation   This visit was performed via live interactive two-way video.    Clinician Location: [facility]    Patient Location: Home  Patient verbally consented to video visit.    VOICEMAIL  1. Caller Name: Mason Craig                        Call Back Number: [phone] (home)       2. Message: Patient said he is out of his trazodone. That when he saw Ruthy Rose she increased him to take 200 mg so he has been taking 1.5 tablets and he is out of refills and out of his medication taking that. Please refill for him at the 200mg dose.    3. Patient approves office to leave a detailed voicemail/[employer] message: yes    Received request via: Patient    Was the patient seen in the last year in this department? Yes    Does the patient have an active prescription (recently filled or refills available) for medication(s) requested? No

## 2021-12-16 NOTE — PATIENT COMMUNICATION
VOICEMAIL  1. Caller Name: Mason Craig                        Call Back Number: [phone] (home)       2. Message: He needs his trazodone, he is completely out.    3. Patient approves office to leave a detailed voicemail/MyChart message: yes    Mason says maybe he got the doses and medication mixed up when he see Ruthy. That he is out of the medication and it helps him sleep so he needs it. He is ok taking the dose ordered without an increase that he thought he was supposed to take. Informed him he had 3 refills if he did not pick them up and he said he doesn't think so and he will reach out to the pharmacy to .

## 2022-08-09 ENCOUNTER — TELEPHONE (OUTPATIENT)
Dept: MEDICAL GROUP | Facility: MEDICAL CENTER | Age: 47
End: 2022-08-09
Payer: COMMERCIAL

## 2022-08-09 NOTE — TELEPHONE ENCOUNTER
VOICEMAIL  1. Caller Name: Mason                      Call Back Number: [phone] (home)       2. Message: Patient called to request medication refills.     [place] is [employer] and was notified back in April that he would need to establish with a new PCP. Pt was also notified that Dr. Brcue would send a 90 day supply of meds over in April while he looked for new provider.     I attempted calling him back but [facility] was not set up.     3. Patient approves office to leave a detailed voicemail/MyChart message: yes

## 2022-08-11 ENCOUNTER — TELEPHONE (OUTPATIENT)
Dept: MEDICAL GROUP | Facility: MEDICAL CENTER | Age: 47
End: 2022-08-11

## 2022-08-11 NOTE — TELEPHONE ENCOUNTER
Caller Name: Mason  Call Back Number: [phone] (home)       How would the patient prefer to be contacted with a response: Phone call OK to leave a detailed message    Patient called to say he was completely out of medications, and needed refills. See encounter from 8/9/22 for more details.     He stated that he switched to [employer], however after scheduling him for an appointment and running insurance, pt still has active [place].     Please advise on whether or not meds can be refilled.

## 2022-08-12 DIAGNOSIS — F41.1 GAD (GENERALIZED ANXIETY DISORDER): ICD-10-CM

## 2022-08-12 DIAGNOSIS — F32.2 CURRENT SEVERE EPISODE OF MAJOR DEPRESSIVE DISORDER WITHOUT PSYCHOTIC FEATURES WITHOUT PRIOR EPISODE (HCC): ICD-10-CM

## 2022-08-12 DIAGNOSIS — I10 ESSENTIAL HYPERTENSION: ICD-10-CM

## 2022-08-12 RX ORDER — HYDROCHLOROTHIAZIDE 25 MG/1
25 TABLET ORAL DAILY
Qty: 60 TABLET | Refills: 0 | Status: SHIPPED | OUTPATIENT
Start: 2022-08-12 | End: 2022-12-05 | Stop reason: SDUPTHER

## 2022-08-12 RX ORDER — TRAZODONE HYDROCHLORIDE 150 MG/1
150 TABLET ORAL
Qty: 60 TABLET | Refills: 0 | Status: SHIPPED | OUTPATIENT
Start: 2022-08-12 | End: 2022-12-05 | Stop reason: SDUPTHER

## 2022-08-12 RX ORDER — PAROXETINE HYDROCHLORIDE 20 MG/1
20 TABLET, FILM COATED ORAL DAILY
Qty: 60 TABLET | Refills: 0 | Status: SHIPPED | OUTPATIENT
Start: 2022-08-12 | End: 2022-12-05 | Stop reason: SDUPTHER

## 2022-08-12 NOTE — PROGRESS NOTES
Patient no longer has a form of insurance that we accept in our office.   Last seen 11/2021 with elevated Bps. Has been on HCTZ. Notes state he did not  buspirone.  He was given 3 mon warning in 04/2022  I am providing only 2 more months of medication - he can no longer receive refills from me and must establish care with a new provider.

## 2022-12-05 ENCOUNTER — OFFICE VISIT (OUTPATIENT)
Dept: INTERNAL MEDICINE | Facility: OTHER | Age: 47
End: 2022-12-05
Payer: COMMERCIAL

## 2022-12-05 VITALS
TEMPERATURE: 98.1 F | BODY MASS INDEX: 28.62 KG/M2 | OXYGEN SATURATION: 96 % | HEART RATE: 83 BPM | SYSTOLIC BLOOD PRESSURE: 182 MMHG | WEIGHT: 235 LBS | HEIGHT: 76 IN | DIASTOLIC BLOOD PRESSURE: 66 MMHG

## 2022-12-05 DIAGNOSIS — G47.00 INSOMNIA, UNSPECIFIED TYPE: ICD-10-CM

## 2022-12-05 DIAGNOSIS — M62.838 MUSCLE SPASM: ICD-10-CM

## 2022-12-05 DIAGNOSIS — Z00.00 ENCOUNTER FOR ROUTINE ADULT HEALTH EXAMINATION WITHOUT ABNORMAL FINDINGS: ICD-10-CM

## 2022-12-05 DIAGNOSIS — F41.1 GAD (GENERALIZED ANXIETY DISORDER): ICD-10-CM

## 2022-12-05 DIAGNOSIS — K50.919 CROHN'S DISEASE WITH COMPLICATION, UNSPECIFIED GASTROINTESTINAL TRACT LOCATION (HCC): ICD-10-CM

## 2022-12-05 DIAGNOSIS — I10 ESSENTIAL HYPERTENSION: ICD-10-CM

## 2022-12-05 DIAGNOSIS — F32.2 CURRENT SEVERE EPISODE OF MAJOR DEPRESSIVE DISORDER WITHOUT PSYCHOTIC FEATURES WITHOUT PRIOR EPISODE (HCC): ICD-10-CM

## 2022-12-05 PROCEDURE — 99204 OFFICE O/P NEW MOD 45 MIN: CPT | Mod: GC | Performed by: STUDENT IN AN ORGANIZED HEALTH CARE EDUCATION/TRAINING PROGRAM

## 2022-12-05 RX ORDER — BUSPIRONE HYDROCHLORIDE 10 MG/1
10 TABLET ORAL 2 TIMES DAILY
Qty: 90 TABLET | Refills: 1 | Status: SHIPPED | OUTPATIENT
Start: 2022-12-05 | End: 2023-04-14

## 2022-12-05 RX ORDER — CYCLOBENZAPRINE HCL 5 MG
5 TABLET ORAL 2 TIMES DAILY PRN
Qty: 30 TABLET | Refills: 0 | Status: SHIPPED | OUTPATIENT
Start: 2022-12-05

## 2022-12-05 RX ORDER — NEBULIZER AND COMPRESSOR
1 EACH MISCELLANEOUS 2 TIMES DAILY PRN
Qty: 1 KIT | Refills: 0 | Status: SHIPPED | OUTPATIENT
Start: 2022-12-05 | End: 2024-02-06

## 2022-12-05 RX ORDER — PAROXETINE HYDROCHLORIDE 20 MG/1
20 TABLET, FILM COATED ORAL DAILY
Qty: 60 TABLET | Refills: 0 | Status: SHIPPED | OUTPATIENT
Start: 2022-12-05 | End: 2023-01-30 | Stop reason: SDUPTHER

## 2022-12-05 RX ORDER — TRAZODONE HYDROCHLORIDE 150 MG/1
150 TABLET ORAL
Qty: 60 TABLET | Refills: 0 | Status: SHIPPED | OUTPATIENT
Start: 2022-12-05 | End: 2023-04-18 | Stop reason: SDUPTHER

## 2022-12-05 RX ORDER — HYDROCHLOROTHIAZIDE 25 MG/1
25 TABLET ORAL DAILY
Qty: 60 TABLET | Refills: 0 | Status: SHIPPED | OUTPATIENT
Start: 2022-12-05 | End: 2022-12-14

## 2022-12-05 ASSESSMENT — ENCOUNTER SYMPTOMS
CHILLS: 0
LOSS OF CONSCIOUSNESS: 0
DOUBLE VISION: 0
WHEEZING: 0
HEMOPTYSIS: 0
SINUS PAIN: 0
BLURRED VISION: 0
COUGH: 0
NERVOUS/ANXIOUS: 1
PALPITATIONS: 0
DEPRESSION: 1
SEIZURES: 0
DIARRHEA: 0
SHORTNESS OF BREATH: 0
CONSTIPATION: 0
CLAUDICATION: 0
SORE THROAT: 0
FOCAL WEAKNESS: 0
HEADACHES: 0
ABDOMINAL PAIN: 0
BLOOD IN STOOL: 0
SPUTUM PRODUCTION: 0
FALLS: 0
INSOMNIA: 1
NAUSEA: 0
VOMITING: 0
FEVER: 0
DIZZINESS: 0
BACK PAIN: 1
MYALGIAS: 0
HEARTBURN: 0
ORTHOPNEA: 0
WEIGHT LOSS: 0

## 2022-12-05 ASSESSMENT — PATIENT HEALTH QUESTIONNAIRE - PHQ9
1. LITTLE INTEREST OR PLEASURE IN DOING THINGS: SEVERAL DAYS
7. TROUBLE CONCENTRATING ON THINGS, SUCH AS READING THE NEWSPAPER OR WATCHING TELEVISION: NOT AT ALL
SUM OF ALL RESPONSES TO PHQ QUESTIONS 1-9: 2
SUM OF ALL RESPONSES TO PHQ9 QUESTIONS 1 AND 2: 2
2. FEELING DOWN, DEPRESSED, IRRITABLE, OR HOPELESS: SEVERAL DAYS
4. FEELING TIRED OR HAVING LITTLE ENERGY: NOT AT ALL
9. THOUGHTS THAT YOU WOULD BE BETTER OFF DEAD, OR OF HURTING YOURSELF: NOT AT ALL
6. FEELING BAD ABOUT YOURSELF - OR THAT YOU ARE A FAILURE OR HAVE LET YOURSELF OR YOUR FAMILY DOWN: NOT AL ALL
8. MOVING OR SPEAKING SO SLOWLY THAT OTHER PEOPLE COULD HAVE NOTICED. OR THE OPPOSITE, BEING SO FIGETY OR RESTLESS THAT YOU HAVE BEEN MOVING AROUND A LOT MORE THAN USUAL: NOT AT ALL
5. POOR APPETITE OR OVEREATING: NOT AT ALL
3. TROUBLE FALLING OR STAYING ASLEEP OR SLEEPING TOO MUCH: NOT AT ALL

## 2022-12-05 NOTE — ASSESSMENT & PLAN NOTE
-Has not been taking his HCTZ 25mg medication for 3 months  -Blood Pressure systolics 180s and on repeat in office today  -Provided with blood pressure log and prescription for BP Cuff  -Advised to record blood pressures twice daily until next visit, to optimize blood pressure medication regimen if indicated

## 2022-12-05 NOTE — ASSESSMENT & PLAN NOTE
-Lost to follow up with GI  -Not currently on any medications for Crohn's  -Endorsed infusions exacerbated his symptoms of anxiety and depression  -No recent flares; denies fever, chills, nausea, vomiting, diarrhea, hematochezia or melena  -Provided with referral to outpatient GI

## 2022-12-05 NOTE — ASSESSMENT & PLAN NOTE
-Provided with Flexeril to help with his back pain to be used sparingly only as needed in the evening

## 2022-12-05 NOTE — PATIENT INSTRUCTIONS
-Please follow up with your outpatient labwork  -Please follow up with your outpatient GI referral  -Please return to our office next week to review your labwork and BP log

## 2022-12-05 NOTE — LETTER
[employer] [facility]  Ruby Bruce M.D.  [address]  [place] [address]  Fax: [phone]   Authorization for Release/Disclosure of   Protected Health Information   Name: PAOLO ROCHE : 1975 SSN: [id number]   Address: [address] [place] [address] Phone:    [phone] (home)    I authorize the entity listed below to release/disclose the PHI below to:   [facility]/Ruby Bruce M.D. and Javier Bethea M.D.   Provider or Entity Name:  [facility]   Address   [facility], [address]            [address] [place] [address] Phone:  [phone]      Fax:       [phone]          Reason for request: continuity of care   Information to be released:    [ X ] LAST COLONOSCOPY,  including any PATH REPORT and follow-up  [ X ] LAST FIT/COLOGUARD RESULT [  ] LAST DEXA  [  ] LAST MAMMOGRAM  [  ] LAST PAP  [  ] LAST LABS [  ] RETINA EXAM REPORT  [  ] IMMUNIZATION RECORDS  [  ] Release all info      [  ] Check here and initial the line next to each item to release ALL health information INCLUDING  _____ Care and treatment for drug and / or alcohol abuse  _____ HIV testing, infection status, or AIDS  _____ Genetic Testing    DATES OF SERVICE OR TIME PERIOD TO BE DISCLOSED: _____________  I understand and acknowledge that:  * This Authorization may be revoked at any time by you in writing, except if your health information has already been used or disclosed.  * Your health information that will be used or disclosed as a result of you signing this authorization could be re-disclosed by the recipient. If this occurs, your re-disclosed health information may no longer be protected by State or Federal laws.  * You may refuse to sign this Authorization. Your refusal will not affect your ability to obtain treatment.  * This Authorization becomes effective upon signing and will  on (date) __________.      If no date is indicated, this Authorization will  one (1) year from the signature date.     Name: Mason Craig    Signature:   Date:     12/5/2022       PLEASE FAX REQUESTED RECORDS BACK TO: [phone]

## 2022-12-05 NOTE — ASSESSMENT & PLAN NOTE
-Has not been taking his medication regimen for 3 months  -Lives at home with his female partner  -Refilled prescription for Buspar and Paroxetine  -Referred to outpatient Psychiatry  -Patient to follow up with our clinic later next week

## 2022-12-05 NOTE — PROGRESS NOTES
"     New Patient    Chief Complaint   Patient presents with    Establish Care    Medication Refill       HISTORY OF PRESENT ILLNESS:     Mr. Mason Craig is our 47 year old male patient with a past medical history that is significant for crohn's disease, hypertension, generalized anxiety disorder, major depressive disorder who presents to our outpatient clinic today to establish care. He is accompanied by his female partner. Patient endorses that in changing his medical insurance recently, he has not had or been taking his outpatient medications (HCTZ, Buspirone, Paroxetine, Trazadone) for 3 months now. Blood pressure in systolic 180s in the office, and on repeat. Patient requesting for medications to be refilled. Says recently he feels like he \"wants to cry almost every day,\" feels anxious, has intermittent SI without intent or homicidal ideation. Has gained 30 pounds unintentionally, and is not able to sleep through the night. He works as a  and lives at home with his female partner. He is being referred to GI as he has been lost to follow up in the context of his Crohn's disease diagnosis; endorsed infusion treatments exacerbated his anxiety and depression. Has not had recent acute flare and denies any diarrhea, fevers, nausea, vomiting, abdominal pain. He is being sent for outpatient labwork including CBC, CMP, Lipid Profile, Hemoglobin A1c. Denies smoking cigarettes, drinks alcohol rarely, and denies illicit substance use. Refilled his Buspar, HCTZ, Paroxetine, and Trazadone prescriptions. Also provided with Flexeril to help with his back pain to be used sparingly only as needed in the evening. Also provided with prescription for Blood Pressure Cuff and requested to measure blood pressure twice daily until he returns after doing his outpatient labwork to come back to our clinic later next week. Referring to outpatient Psychiatry as well.    Past Medical History:   Diagnosis Date    ADHD     Crohn's " disease (HCC)     Hypertension     bird        Patient Active Problem List    Diagnosis Date Noted    Insomnia 12/05/2022    Encounter for routine adult health examination 12/05/2022    Muscle spasm 12/05/2022    SHEY (generalized anxiety disorder) 08/20/2021    Crohn's disease ([facility]) 11/04/2020    Essential hypertension 11/04/2020    Current severe episode of major depressive disorder without psychotic features without prior episode ([facility]) 11/04/2020       Allergies:Patient has no allergy information on record.    Current Outpatient Medications   Medication Sig Dispense Refill    busPIRone (BUSPAR) 10 MG Tab tablet Take 1 Tablet by mouth 2 times a day. Start with 1/2 tab twice daily for 1 week, then increase to 1 tab twice daily 90 Tablet 1    hydroCHLOROthiazide (HYDRODIURIL) 25 MG Tab Take 1 Tablet by mouth every day. 60 Tablet 0    PARoxetine (PAXIL) 20 MG Tab Take 1 Tablet by mouth every day. 60 Tablet 0    traZODone (DESYREL) 150 MG Tab Take 1 Tablet by mouth at bedtime. 60 Tablet 0    cyclobenzaprine (FLEXERIL) 5 mg tablet Take 1 Tablet by mouth 2 times a day as needed for Muscle Spasms or Moderate Pain. 30 Tablet 0    Blood Pressure Monitoring (ADULT BLOOD PRESSURE CUFF LG) Kit 1 Kit 2 times a day as needed (Check Blood Pressure). 1 Kit 0     No current facility-administered medications for this visit.       Social History     Tobacco Use    Smoking status: Never    Smokeless tobacco: Never   Vaping Use    Vaping Use: Never used   Substance Use Topics    Alcohol use: Yes     Comment: occ     Drug use: No       Family History   Problem Relation Age of Onset    No Known Problems Mother     No Known Problems Father     Cancer Neg Hx     Diabetes Neg Hx     Hypertension Neg Hx     Hyperlipidemia Neg Hx     Stroke Neg Hx     Heart Disease Neg Hx          Review of Systems   Constitutional:  Negative for chills, fever, malaise/fatigue and weight loss.        30 pound weight gain   HENT:  Negative for sinus pain and  "sore throat.    Eyes:  Negative for blurred vision and double vision.   Respiratory:  Negative for cough, hemoptysis, sputum production, shortness of breath and wheezing.    Cardiovascular:  Negative for chest pain, palpitations, orthopnea and claudication.   Gastrointestinal:  Negative for abdominal pain, blood in stool, constipation, diarrhea, heartburn, melena, nausea and vomiting.   Genitourinary:  Negative for dysuria, frequency and urgency.   Musculoskeletal:  Positive for back pain. Negative for falls and myalgias.   Skin:  Negative for itching and rash.   Neurological:  Negative for dizziness, focal weakness, seizures, loss of consciousness and headaches.   Psychiatric/Behavioral:  Positive for depression and suicidal ideas. The patient is nervous/anxious and has insomnia.      Exam:  BP (!) 182/66   Pulse 83   Temp 36.7 °C (98.1 °F) (Temporal)   Ht 1.93 m (6' 4\")   Wt 107 kg (235 lb)   SpO2 96%  Body mass index is 28.61 kg/m².    Constitutional:  Not in acute distress, well appearing.  HEENT:   Normocephalic, atraumatic.  Cardiovascular: S1, S2; Regular rate and rhythm; No murmurs/rubs/gallops.  Lungs:   Clear to auscultation bilaterally; No wheezes/rhales/rhonchi; No respiratory distress.  Abdomen: Soft, nondistended, not tender to palpation; no guarding no rigidity; no masses.  Extremities:  No cyanosis/clubbing/edema; No obvious deformities.  Skin:  Warm and dry.  No visible rashes.  Neurologic: Alert Awake & Oriented x 3, CN II-XII grossly intact; strength and sensation grossly intact.  No focal deficits noted.  Psychiatric:  Affect normal, mood normal, judgment normal.    Assessment/Plan:   Mr. Mason Craig is our 47 year old male patient with a past medical history that is significant for crohn's disease, hypertension, generalized anxiety disorder, major depressive disorder who presents to our outpatient clinic today to establish care.    SHEY (generalized anxiety disorder)  -Has not been taking " his medication regimen for 3 months  -Lives at home with his female partner  -Refilled prescription for Buspar and Paroxetine  -Referred to outpatient Psychiatry  -Patient to follow up with our clinic later next week    Insomnia  -Provided refill for Trazadone prescription    Essential hypertension  -Has not been taking his HCTZ 25mg medication for 3 months  -Blood Pressure systolics 180s and on repeat in office today  -Provided with blood pressure log and prescription for BP Cuff  -Advised to record blood pressures twice daily until next visit, to optimize blood pressure medication regimen if indicated    Encounter for routine adult health examination  -Sent outpatient lab work: CBC, CMP, Lipid Profile, Hemoglobin A1c  -Already received influenza vaccination this year    Crohn's disease (HCC)  -Lost to follow up with GI  -Not currently on any medications for Crohn's  -Endorsed infusions exacerbated his symptoms of anxiety and depression  -No recent flares; denies fever, chills, nausea, vomiting, diarrhea, hematochezia or melena  -Provided with referral to outpatient GI    Muscle spasm  -Provided with Flexeril to help with his back pain to be used sparingly only as needed in the evening     All imaging results and lab results and consult notes are reviewed at this visit.  Followup: Return in about 1 week (around 12/12/2022).    Javier Bethea MD  PGY-2 Internal Medicine Resident

## 2022-12-05 NOTE — ASSESSMENT & PLAN NOTE
-Sent outpatient lab work: CBC, CMP, Lipid Profile, Hemoglobin A1c  -Already received influenza vaccination this year

## 2022-12-14 ENCOUNTER — OFFICE VISIT (OUTPATIENT)
Dept: INTERNAL MEDICINE | Facility: OTHER | Age: 47
End: 2022-12-14
Payer: COMMERCIAL

## 2022-12-14 VITALS
DIASTOLIC BLOOD PRESSURE: 92 MMHG | HEIGHT: 76 IN | TEMPERATURE: 97.1 F | HEART RATE: 91 BPM | OXYGEN SATURATION: 94 % | SYSTOLIC BLOOD PRESSURE: 138 MMHG | WEIGHT: 239.4 LBS | BODY MASS INDEX: 29.15 KG/M2

## 2022-12-14 DIAGNOSIS — M62.838 MUSCLE SPASM: ICD-10-CM

## 2022-12-14 DIAGNOSIS — I15.9 SECONDARY HYPERTENSION: ICD-10-CM

## 2022-12-14 DIAGNOSIS — G47.00 INSOMNIA, UNSPECIFIED TYPE: ICD-10-CM

## 2022-12-14 DIAGNOSIS — I10 ESSENTIAL HYPERTENSION: ICD-10-CM

## 2022-12-14 DIAGNOSIS — F41.1 GAD (GENERALIZED ANXIETY DISORDER): ICD-10-CM

## 2022-12-14 PROCEDURE — 99213 OFFICE O/P EST LOW 20 MIN: CPT | Mod: GE | Performed by: STUDENT IN AN ORGANIZED HEALTH CARE EDUCATION/TRAINING PROGRAM

## 2022-12-14 RX ORDER — LISINOPRIL AND HYDROCHLOROTHIAZIDE 25; 20 MG/1; MG/1
1 TABLET ORAL DAILY
Qty: 30 TABLET | Refills: 3 | Status: SHIPPED | OUTPATIENT
Start: 2022-12-14 | End: 2023-06-30

## 2022-12-14 ASSESSMENT — ENCOUNTER SYMPTOMS
ORTHOPNEA: 0
LOSS OF CONSCIOUSNESS: 0
ABDOMINAL PAIN: 0
SEIZURES: 0
NECK PAIN: 0
SORE THROAT: 0
DOUBLE VISION: 0
CHILLS: 0
SPUTUM PRODUCTION: 0
FLANK PAIN: 0
NAUSEA: 0
BLURRED VISION: 0
BACK PAIN: 1
WEIGHT LOSS: 0
VOMITING: 0
HEMOPTYSIS: 0
COUGH: 0
PALPITATIONS: 0
HEADACHES: 0
FEVER: 0
HEARTBURN: 0
DIZZINESS: 0
MYALGIAS: 0

## 2022-12-14 NOTE — PROGRESS NOTES
Established Patient    Chief Complaint   Patient presents with    Hypertension     Follow up       HISTORY OF PRESENT ILLNESS:     Mr. Mason Craig is our 47 year old male patient with a past medical history that is significant for crohn's disease, hypertension, generalized anxiety disorder, major depressive disorder who presents to our outpatient clinic today for follow up of his blood pressure management. At prior visit, patient endorsed that in changing his medical insurance recently, he had not been taking his outpatient medications (HCTZ, Buspirone, Paroxetine, Trazadone) for 3 months. Blood pressure was in systolic 180s in the office, and on repeat. Was provided with prescription for Blood Pressure Cuff and requested to measure blood pressure twice daily until he returns after doing his outpatient labwork to come back to our clinic.    Blood pressure in clinic today 138/92. Patient endorsed, however, that because he was concerned with higher blood pressure readings in the 140s he had started taking double the dose of his HCTZ 25mg (for a total of HCTZ 50mg daily) since Sunday. Uptitrating his medication to Lisinopril 20-HCTZ 25 today. Advised patient to continue monitoring his blood pressures, and follow up in 1 week to confirm effective BP control.    Patient also endorses that his anxiety is noticably better controlled since being prescribed his Buspar and Paroxetine after not taking for 3 months leading up to prior visit due to insurance issues. Insomnia controlled with Trazadone.    Patient has not yet conducted his outpatient lab work. Encouraged to follow up with this prior to next visit.       Past Medical History:   Diagnosis Date    ADHD     Crohn's disease (HCC)     Hypertension     krones        Patient Active Problem List    Diagnosis Date Noted    Insomnia 12/05/2022    Encounter for routine adult health examination 12/05/2022    Muscle spasm 12/05/2022    SHEY (generalized anxiety disorder)  08/20/2021    Crohn's disease (HCC) 11/04/2020    Essential hypertension 11/04/2020    Current severe episode of major depressive disorder without psychotic features without prior episode ([facility]) 11/04/2020       Allergies: Patient has no known allergies.    Current Outpatient Medications   Medication Sig Dispense Refill    lisinopril-hydrochlorothiazide (PRINZIDE) 20-25 MG per tablet Take 1 Tablet by mouth every day. 30 Tablet 3    busPIRone (BUSPAR) 10 MG Tab tablet Take 1 Tablet by mouth 2 times a day. Start with 1/2 tab twice daily for 1 week, then increase to 1 tab twice daily 90 Tablet 1    PARoxetine (PAXIL) 20 MG Tab Take 1 Tablet by mouth every day. 60 Tablet 0    traZODone (DESYREL) 150 MG Tab Take 1 Tablet by mouth at bedtime. 60 Tablet 0    cyclobenzaprine (FLEXERIL) 5 mg tablet Take 1 Tablet by mouth 2 times a day as needed for Muscle Spasms or Moderate Pain. 30 Tablet 0    Blood Pressure Monitoring (ADULT BLOOD PRESSURE CUFF LG) Kit 1 Kit 2 times a day as needed (Check Blood Pressure). 1 Kit 0     No current facility-administered medications for this visit.       Social History     Tobacco Use    Smoking status: Never    Smokeless tobacco: Never   Vaping Use    Vaping Use: Never used   Substance Use Topics    Alcohol use: Yes     Comment: occ     Drug use: No       Family History   Problem Relation Age of Onset    No Known Problems Mother     No Known Problems Father     Cancer Neg Hx     Diabetes Neg Hx     Hypertension Neg Hx     Hyperlipidemia Neg Hx     Stroke Neg Hx     Heart Disease Neg Hx          Review of Systems   Constitutional:  Negative for chills, fever and weight loss.   HENT:  Negative for sore throat.    Eyes:  Negative for blurred vision and double vision.   Respiratory:  Negative for cough, hemoptysis and sputum production.    Cardiovascular:  Negative for chest pain, palpitations and orthopnea.   Gastrointestinal:  Negative for abdominal pain, heartburn, nausea and vomiting.  "  Genitourinary:  Negative for dysuria, flank pain, frequency and urgency.   Musculoskeletal:  Positive for back pain. Negative for myalgias and neck pain.   Skin:  Negative for itching and rash.   Neurological:  Negative for dizziness, seizures, loss of consciousness and headaches.     Exam:  BP (!) 138/92 (BP Location: Left arm, Patient Position: Sitting, BP Cuff Size: Adult)   Pulse 91   Temp 36.2 °C (97.1 °F) (Temporal)   Ht 1.93 m (6' 4\")   Wt 109 kg (239 lb 6.4 oz)   SpO2 94%  Body mass index is 29.14 kg/m².    Constitutional:  Not in acute distress, well appearing.  HEENT:   Normocephalic, atraumatic.  Cardiovascular: S1, S2; Regular rate and rhythm; No murmurs/rubs/gallops.  Lungs:   Clear to auscultation bilaterally; No wheezes/rhales/rhonchi; No respiratory distress.  Abdomen: Soft, nondistended, not tender to palpation; no guarding no rigidity; no masses.  Extremities:  No cyanosis/clubbing/edema; No obvious deformities.  Skin:  Warm and dry.  No visible rashes.  Neurologic: Alert Awake & Oriented x 3, CN II-XII grossly intact; strength and sensation grossly intact.  No focal deficits noted.  Psychiatric:  Affect normal, mood normal, judgment normal.    Assessment/Plan:   Mr. Mason Craig is our 47 year old male patient with a past medical history that is significant for crohn's disease, hypertension, generalized anxiety disorder, major depressive disorder who presents to our outpatient clinic today for follow up of his blood pressure management.    Insomnia  -Controlled well with Trazadone    Muscle spasm  -Occasionally using Flexeril sparingly as needed in evenings for back pain with good control of symptoms    SHEY (generalized anxiety disorder)  -Continuing with Buspar 10mg BID and Paroxetine 20mg daily  -Provided with referral to outpatient Psychiatry; has not followed up yet; encouraged to follow up with referral    Essential hypertension  -Patient with systolic blood pressures in 140s on HCTZ " 50mg  -Advised patient not to continue with HCTZ 50mg  -Prescribing Lisinopril 20mg-HCTZ 25mg  -Encouraged patient to continue recording blood pressure log  -Patient to return to clinic next week to review BP tend and confirm antihypertensive medication regimen  -Instructed to contact office if records systolic BPs <100-110 to downtitrate medication, or if symptomatic dizziness or lightheaded     All imaging results and lab results and consult notes are reviewed at this visit.  Followup: Return in about 1 week (around 12/21/2022).    Javier Bethea MD  PGY-2 Internal Medicine Resident

## 2022-12-14 NOTE — ASSESSMENT & PLAN NOTE
-Occasionally using Flexeril sparingly as needed in evenings for back pain with good control of symptoms

## 2022-12-14 NOTE — ASSESSMENT & PLAN NOTE
-Patient with systolic blood pressures in 140s on HCTZ 50mg  -Advised patient not to continue with HCTZ 50mg  -Prescribing Lisinopril 20mg-HCTZ 25mg  -Encouraged patient to continue recording blood pressure log  -Patient to return to clinic next week to review BP tend and confirm antihypertensive medication regimen  -Instructed to contact office if records systolic BPs <100-110 to downtitrate medication, or if symptomatic dizziness or lightheaded

## 2022-12-14 NOTE — ASSESSMENT & PLAN NOTE
-Continuing with Buspar 10mg BID and Paroxetine 20mg daily  -Provided with referral to outpatient Psychiatry; has not followed up yet; encouraged to follow up with referral

## 2023-01-30 DIAGNOSIS — F32.2 CURRENT SEVERE EPISODE OF MAJOR DEPRESSIVE DISORDER WITHOUT PSYCHOTIC FEATURES WITHOUT PRIOR EPISODE (HCC): ICD-10-CM

## 2023-01-30 DIAGNOSIS — F41.1 GAD (GENERALIZED ANXIETY DISORDER): ICD-10-CM

## 2023-01-30 RX ORDER — PAROXETINE HYDROCHLORIDE 20 MG/1
20 TABLET, FILM COATED ORAL DAILY
Qty: 60 TABLET | Refills: 0 | Status: SHIPPED | OUTPATIENT
Start: 2023-01-30 | End: 2023-04-14

## 2023-01-30 NOTE — TELEPHONE ENCOUNTER
Received request via: Pharmacy    Was the patient seen in the last year in this department? Yes    Does the patient have an active prescription (recently filled or refills available) for medication(s) requested? No    Does the patient have [facility] and need 100 day supply (blood pressure, diabetes and cholesterol meds only)? Patient does not have SCP

## 2023-04-11 DIAGNOSIS — F41.1 GAD (GENERALIZED ANXIETY DISORDER): ICD-10-CM

## 2023-04-11 DIAGNOSIS — F32.2 CURRENT SEVERE EPISODE OF MAJOR DEPRESSIVE DISORDER WITHOUT PSYCHOTIC FEATURES WITHOUT PRIOR EPISODE (HCC): ICD-10-CM

## 2023-04-14 RX ORDER — BUSPIRONE HYDROCHLORIDE 10 MG/1
TABLET ORAL
Qty: 90 TABLET | Refills: 0 | Status: SHIPPED | OUTPATIENT
Start: 2023-04-14 | End: 2023-05-25 | Stop reason: SDUPTHER

## 2023-04-14 RX ORDER — PAROXETINE HYDROCHLORIDE 20 MG/1
TABLET, FILM COATED ORAL
Qty: 60 TABLET | Refills: 0 | Status: SHIPPED | OUTPATIENT
Start: 2023-04-14 | End: 2023-06-27

## 2023-04-18 DIAGNOSIS — F32.2 CURRENT SEVERE EPISODE OF MAJOR DEPRESSIVE DISORDER WITHOUT PSYCHOTIC FEATURES WITHOUT PRIOR EPISODE (HCC): ICD-10-CM

## 2023-04-19 RX ORDER — TRAZODONE HYDROCHLORIDE 150 MG/1
150 TABLET ORAL
Qty: 60 TABLET | Refills: 0 | Status: SHIPPED | OUTPATIENT
Start: 2023-04-19 | End: 2023-06-27

## 2023-05-16 DIAGNOSIS — F41.1 GAD (GENERALIZED ANXIETY DISORDER): ICD-10-CM

## 2023-05-16 NOTE — TELEPHONE ENCOUNTER
Received request via: Pharmacy    Was the patient seen in the last year in this department? Yes    Does the patient have an active prescription (recently filled or refills available) for medication(s) requested? No    Does the patient have [facility] and need 100 day supply (blood pressure, diabetes and cholesterol meds only)? Patient does not have SCP

## 2023-05-25 RX ORDER — BUSPIRONE HYDROCHLORIDE 10 MG/1
TABLET ORAL
Qty: 90 TABLET | Refills: 3 | Status: SHIPPED | OUTPATIENT
Start: 2023-05-25 | End: 2023-08-07 | Stop reason: SDUPTHER

## 2023-06-24 DIAGNOSIS — F32.2 CURRENT SEVERE EPISODE OF MAJOR DEPRESSIVE DISORDER WITHOUT PSYCHOTIC FEATURES WITHOUT PRIOR EPISODE (HCC): ICD-10-CM

## 2023-06-24 DIAGNOSIS — F41.1 GAD (GENERALIZED ANXIETY DISORDER): ICD-10-CM

## 2023-06-27 RX ORDER — TRAZODONE HYDROCHLORIDE 150 MG/1
150 TABLET ORAL
Qty: 60 TABLET | Refills: 0 | Status: SHIPPED | OUTPATIENT
Start: 2023-06-27 | End: 2023-09-09

## 2023-06-27 RX ORDER — PAROXETINE HYDROCHLORIDE 20 MG/1
TABLET, FILM COATED ORAL
Qty: 60 TABLET | Refills: 0 | Status: SHIPPED | OUTPATIENT
Start: 2023-06-27 | End: 2023-11-06

## 2023-06-29 NOTE — TELEPHONE ENCOUNTER
Received request via: Pharmacy    Was the patient seen in the last year in this department? Yes    Does the patient have an active prescription (recently filled or refills available) for medication(s) requested? No    Does the patient have group home Plus and need 100 day supply (blood pressure, diabetes and cholesterol meds only)? Patient does not have SCP

## 2023-06-30 RX ORDER — LISINOPRIL AND HYDROCHLOROTHIAZIDE 25; 20 MG/1; MG/1
TABLET ORAL
Qty: 30 TABLET | Refills: 0 | Status: SHIPPED | OUTPATIENT
Start: 2023-06-30 | End: 2023-07-24

## 2023-07-24 RX ORDER — LISINOPRIL AND HYDROCHLOROTHIAZIDE 25; 20 MG/1; MG/1
TABLET ORAL
Qty: 30 TABLET | Refills: 0 | Status: SHIPPED | OUTPATIENT
Start: 2023-07-24 | End: 2023-07-30

## 2023-07-30 RX ORDER — LISINOPRIL AND HYDROCHLOROTHIAZIDE 25; 20 MG/1; MG/1
TABLET ORAL
Qty: 30 TABLET | Refills: 3 | Status: SHIPPED | OUTPATIENT
Start: 2023-07-30 | End: 2024-01-08

## 2023-08-07 DIAGNOSIS — F41.1 GAD (GENERALIZED ANXIETY DISORDER): ICD-10-CM

## 2023-08-07 NOTE — TELEPHONE ENCOUNTER
Received request via: Patient    Was the patient seen in the last year in this department? Yes    Does the patient have an active prescription (recently filled or refills available) for medication(s) requested? No    Does the patient have [facility] and need 100 day supply (blood pressure, diabetes and cholesterol meds only)? Patient does not have SCP

## 2023-08-08 RX ORDER — BUSPIRONE HYDROCHLORIDE 10 MG/1
TABLET ORAL
Qty: 90 TABLET | Refills: 3 | Status: SHIPPED | OUTPATIENT
Start: 2023-08-08 | End: 2024-02-28 | Stop reason: SDUPTHER

## 2023-08-15 ENCOUNTER — OFFICE VISIT (OUTPATIENT)
Dept: INTERNAL MEDICINE | Facility: OTHER | Age: 48
End: 2023-08-15
Payer: COMMERCIAL

## 2023-08-15 VITALS
HEART RATE: 92 BPM | DIASTOLIC BLOOD PRESSURE: 85 MMHG | WEIGHT: 239.6 LBS | HEIGHT: 76 IN | OXYGEN SATURATION: 90 % | BODY MASS INDEX: 29.18 KG/M2 | TEMPERATURE: 97.5 F | SYSTOLIC BLOOD PRESSURE: 128 MMHG

## 2023-08-15 DIAGNOSIS — I10 ESSENTIAL HYPERTENSION: ICD-10-CM

## 2023-08-15 DIAGNOSIS — F30.8 HYPOMANIA (HCC): ICD-10-CM

## 2023-08-15 DIAGNOSIS — N52.9 ERECTILE DYSFUNCTION, UNSPECIFIED ERECTILE DYSFUNCTION TYPE: ICD-10-CM

## 2023-08-15 DIAGNOSIS — K50.919 CROHN'S DISEASE WITH COMPLICATION, UNSPECIFIED GASTROINTESTINAL TRACT LOCATION (HCC): ICD-10-CM

## 2023-08-15 DIAGNOSIS — F41.1 GAD (GENERALIZED ANXIETY DISORDER): ICD-10-CM

## 2023-08-15 DIAGNOSIS — Z13.228 SCREENING FOR METABOLIC DISORDER: ICD-10-CM

## 2023-08-15 PROBLEM — M62.838 MUSCLE SPASM: Status: RESOLVED | Noted: 2022-12-05 | Resolved: 2023-08-15

## 2023-08-15 PROCEDURE — 3079F DIAST BP 80-89 MM HG: CPT | Performed by: STUDENT IN AN ORGANIZED HEALTH CARE EDUCATION/TRAINING PROGRAM

## 2023-08-15 PROCEDURE — 99214 OFFICE O/P EST MOD 30 MIN: CPT | Mod: GC | Performed by: STUDENT IN AN ORGANIZED HEALTH CARE EDUCATION/TRAINING PROGRAM

## 2023-08-15 PROCEDURE — 3074F SYST BP LT 130 MM HG: CPT | Performed by: STUDENT IN AN ORGANIZED HEALTH CARE EDUCATION/TRAINING PROGRAM

## 2023-08-15 RX ORDER — PAROXETINE HYDROCHLORIDE 20 MG/1
10 TABLET, FILM COATED ORAL DAILY
Qty: 30 TABLET | Refills: 2 | Status: SHIPPED | OUTPATIENT
Start: 2023-08-15 | End: 2023-11-06

## 2023-08-15 ASSESSMENT — ENCOUNTER SYMPTOMS
PALPITATIONS: 0
VOMITING: 0
BACK PAIN: 0
COUGH: 0
SPUTUM PRODUCTION: 0
HEMOPTYSIS: 0
DOUBLE VISION: 0
HEADACHES: 0
SHORTNESS OF BREATH: 0
MYALGIAS: 0
BLURRED VISION: 0
NAUSEA: 0
WEIGHT LOSS: 0
NERVOUS/ANXIOUS: 1
FEVER: 0
NECK PAIN: 0
DIZZINESS: 0
ABDOMINAL PAIN: 0
CONSTIPATION: 0
HALLUCINATIONS: 0
ORTHOPNEA: 0
CHILLS: 0
DIARRHEA: 0
CLAUDICATION: 0
HEARTBURN: 0

## 2023-08-15 ASSESSMENT — PATIENT HEALTH QUESTIONNAIRE - PHQ9
SUM OF ALL RESPONSES TO PHQ QUESTIONS 1-9: 15
CLINICAL INTERPRETATION OF PHQ2 SCORE: 4
5. POOR APPETITE OR OVEREATING: 0 - NOT AT ALL

## 2023-08-15 ASSESSMENT — LIFESTYLE VARIABLES: SUBSTANCE_ABUSE: 0

## 2023-08-15 NOTE — PATIENT INSTRUCTIONS
-GI referral:  [facility]  [address] [address]  Phone: [phone]    -Psych referral:  [facility]  [address]  [place] [place] [address]  Phone: [phone]    -Please follow up outpatient labs

## 2023-08-15 NOTE — PROGRESS NOTES
Teaching Physician Attestation      Level of Participation    I have personally interviewed and examined the patient.  In addition, I discussed with the resident physician the patient's history, exam, assessment and plan in detail.  Topics listed in my addendum were the focus of the visit.  Healthcare maintenance was not addressed this visit unless listed as a topic in my addendum.  I agree with the plan as written along with the following additions/modifications:      Hypomania in setting of likely bipolar disorder  -Patient has diffusely elevated energy, lack of sleep/no sleep at times but despite this is not tired, racing thoughts, relatively pressured speech, impulsivity, flight of ideas and tangential thoughts.  Symptoms relatively mild c/w florid veronica, able to interrupt pressured speech, but symptoms consistent with veronica.  No substance use reported on emr review.  Also reported to resident that he felt that these symptoms became more prominent with initiation of his SSRI medication.  No SI/HI or hallucinations. Denies substance use or current energy drinks. Safety contract reviewed with patient.  Is also reporting some erectile dysfunction that may have correlated with SSRI initiation.    Plan  -Patient already has psychiatry referral, emphasized the critical importance of establishing with psychiatry.  Will likely benefit from mood stabilizer.  -Begin decreasing SSRI therapy, specifically will decrease Paxil to 10 mg daily.  Given potential for side effects, will slowly wean.  For now continuing trazodone although ultimately would look to wean this as well, patient does state trazodone is helping with his sleep.  Keep BuSpar at same dose, but ultimately would also look to down titrate this.  We are not completely discontinuing all of these medications as patient has been chronically on these medications for many months, is not floridly decompensated, states he feels that these medications have helped in  terms of his depression symptoms, and will hopefully have upcoming psychiatry support soon.  We will follow-up closely.    HTN  -no sx.  At goal  -emphasized importance of bmp, pending, also check keyana/cr    Other metabolic screening ordered to prep for f/u.    Encouranged gi follow up    Return to clinic within 1 month.

## 2023-08-15 NOTE — ASSESSMENT & PLAN NOTE
-Well controlled on Lisinopril-HCTZ 20-25mg  -Blood pressure 128/85 today  -Encouraged to bring BP log and cuff to next visit  -Denies slurred speech, facial droop, no pronator drift on exam; denies chest pain, lightheadedness, or dizziness

## 2023-08-15 NOTE — ASSESSMENT & PLAN NOTE
-Patient on Paroxetine 20mg daily, Buspar 10mg BID  -Of note, patient also on Trazadone 150mg  -Appears with Hypomania - pressured speech; endorsing sleepless nights with same energy levels; endorses history of recent impulsivity (spending money) and grandiosity (problem solving); flight of ideas; tangential speech; racing thoughts  -Previously referred to Psychiatry outpatient but has not yet established  -Encouraged to please connect with Psychiatry; provided phone number  -Decreasing Paroxetine dose to 10mg daily  -Element of veronica; in context of 3 serotinergic medications  -Continuing to monitor closely  -Entered new Psychiatry referral as urgent, may need to be considered for a Mood Stabilizer  -Advised patient to please come into clinic as soon as he sees psychiatry and gets his lab work done  -Denies suicidal or homicidal ideation  -Advised patient to call clinic or visit ER if acutely distressed

## 2023-08-15 NOTE — ASSESSMENT & PLAN NOTE
-Endorses several months of erectile dysfunction, decrease libido  -Symptoms may be in context of resuming SSRI therapy  -Denies smoking tobacco; drinks 3 beers a month  -Provided Psychiatry outpatient referral to tailor medication regimen  -Continuing to monitor; anticipate to improve with decrease SSRI dose

## 2023-08-15 NOTE — ASSESSMENT & PLAN NOTE
"-Not currently symptomatic; endorses \"I take care of it myself\"  -Previously provided with outpatient referral to GI  -Encouraged to establish with GI; provided phone number; patient amenable  "

## 2023-08-15 NOTE — PROGRESS NOTES
"     Established Patient    Chief Complaint   Patient presents with    Medication Problem     Medication changes in body.     Hypertension       HISTORY OF PRESENT ILLNESS:     Mr. Mason Craig is our 47 year old male patient with a past medical history that is significant for crohn's disease, hypertension, generalized anxiety disorder, major depressive disorder who presents to our outpatient clinic today for routine follow up. He does mention upon further questioning that he has been experiencing erectile dysfunction for the past few months.    Patient has been adherent to his Lisinopril-HCTZ 20-25mg daily. Blood pressure today is 128/85. Patient did not bring BP log or cuff to this visit; encouraged to please do so next visit. Denies slurred speech, facial droop, no pronator drift on exam; denies chest pain, lightheadedness, or dizziness.    Of note, patient appears with some pressured speech; endorsed having sleepless nights sometimes 1-2x week, with same energy levels. Other nights will sleep 3-6 hours, interrupted. In terms of impulsivity, patient mentions he has been splurging on more gifts for friends and family such as indoor golf simulator and another TV, \"things I don't need.\" May demonstrate elements of grandiosity when it comes to problem solving. Denies wreckless behavior; denies suicidal or homicidal ideation; denies visual or auditory hallucinations. At prior visits, provided patient with referral to outpatient Psychiatry; patient has not yet followed up with Psychiatry. Emphasized importance of please establishing with Psychiatry, and provided direct phone number to help facilitate. At this time, concern for hypomania, and currently dialing back Paroxetine from 20mg to 10mg daily; patient also on Trazadone 150mg and Buspar 10mg BID.    With regard to concern for erectile dysfunction, high likelihood this is related to psychiatric component on 3 serotinergic agents. Denies any history of smoking " tobacco; occasionally drinks 3 beers a month. Again, emphasized importance of follow up with Psychiatry and let patient know if he is acutely suffering, or feeling any SI or HI, to please call our office or go to the emergency room.    Also encouraged patient to please follow up with GI referral, and provided phone number, to continue monitoring and managing his Crohn's disease.    Past Medical History:   Diagnosis Date    ADHD     Crohn's disease (HCC)     Hypertension     krones        Patient Active Problem List    Diagnosis Date Noted    Hypomania (HCC) 08/18/2023    Screening for metabolic disorder 08/15/2023    Erectile dysfunction 08/15/2023    Insomnia 12/05/2022    Encounter for routine adult health examination 12/05/2022    SHEY (generalized anxiety disorder) 08/20/2021    Crohn's disease (HCC) 11/04/2020    Essential hypertension 11/04/2020       Allergies:Patient has no known allergies.    Current Outpatient Medications   Medication Sig Dispense Refill    PARoxetine (PAXIL) 20 MG Tab Take 0.5 Tablets by mouth every day. 30 Tablet 2    busPIRone (BUSPAR) 10 MG Tab tablet TAKE 1/2 (ONE-HALF) TABLET BY MOUTH TWICE DAILY FOR 7 DAYS THEN TAKE 1 TABLET TWICE DAILY 90 Tablet 3    lisinopril-hydrochlorothiazide (PRINZIDE) 20-25 MG per tablet Take 1 tablet by mouth once daily 30 Tablet 3    PARoxetine (PAXIL) 20 MG Tab Take 1 tablet by mouth once daily 60 Tablet 0    traZODone (DESYREL) 150 MG Tab TAKE 1 TABLET BY MOUTH AT BEDTIME 60 Tablet 0    cyclobenzaprine (FLEXERIL) 5 mg tablet Take 1 Tablet by mouth 2 times a day as needed for Muscle Spasms or Moderate Pain. (Patient not taking: Reported on 8/15/2023) 30 Tablet 0    Blood Pressure Monitoring (ADULT BLOOD PRESSURE CUFF LG) Kit 1 Kit 2 times a day as needed (Check Blood Pressure). (Patient not taking: Reported on 8/15/2023) 1 Kit 0     No current facility-administered medications for this visit.       Social History     Tobacco Use    Smoking status: Never     "Smokeless tobacco: Never   Vaping Use    Vaping Use: Never used   Substance Use Topics    Alcohol use: Yes     Comment: occ     Drug use: No       Family History   Problem Relation Age of Onset    No Known Problems Mother     No Known Problems Father     Cancer Neg Hx     Diabetes Neg Hx     Hypertension Neg Hx     Hyperlipidemia Neg Hx     Stroke Neg Hx     Heart Disease Neg Hx          Review of Systems   Constitutional:  Negative for chills, fever, malaise/fatigue and weight loss.        More energy, sleepless nights   HENT:  Negative for hearing loss.    Eyes:  Negative for blurred vision and double vision.   Respiratory:  Negative for cough, hemoptysis, sputum production and shortness of breath.    Cardiovascular:  Negative for chest pain, palpitations, orthopnea and claudication.   Gastrointestinal:  Negative for abdominal pain, constipation, diarrhea, heartburn, nausea and vomiting.   Genitourinary:  Negative for dysuria, frequency, hematuria and urgency.   Musculoskeletal:  Negative for back pain, myalgias and neck pain.   Skin:  Negative for itching and rash.   Neurological:  Negative for dizziness and headaches.   Psychiatric/Behavioral:  Negative for hallucinations and substance abuse. The patient is nervous/anxious.        Exam:  /85 (BP Location: Left arm, Patient Position: Sitting, BP Cuff Size: Adult)   Pulse 92   Temp 36.4 °C (97.5 °F) (Temporal)   Ht 1.93 m (6' 4\")   Wt 109 kg (239 lb 9.6 oz)   SpO2 90%  Body mass index is 29.17 kg/m².    Constitutional:  anxious, pressured speech  HEENT:   Normocephalic, atraumatic.  Cardiovascular: S1, S2; Regular rate and rhythm; No murmurs/rubs/gallops.  Lungs:   Clear to auscultation bilaterally; No wheezes/rhales/rhonchi; No respiratory distress.  Abdomen: Soft, nondistended, not tender to palpation; no guarding no rigidity; no masses.  Extremities:  No cyanosis/clubbing/edema; No obvious deformities.  Skin:  Warm and dry.  No visible " "rashes.  Neurologic: Alert Awake & Oriented x 3, CN II-XII grossly intact; strength and sensation grossly intact.  No focal deficits noted.  Psychiatric:  pressured speech; anecdotally endorsing components of impulsivity and grandiosity; flight of ideas; tangential    Assessment/Plan:   Mr. Mason Craig is our 47 year old male patient with a past medical history that is significant for crohn's disease, hypertension, generalized anxiety disorder, major depressive disorder who presents to our outpatient clinic today for routine follow up.     Screening for metabolic disorder  -Follow up outpatient CMP, Lipid Profile, Hemoglobin A1c, Microalbumin-Cr ratio    Essential hypertension  -Well controlled on Lisinopril-HCTZ 20-25mg  -Blood pressure 128/85 today  -Encouraged to bring BP log and cuff to next visit  -Denies slurred speech, facial droop, no pronator drift on exam; denies chest pain, lightheadedness, or dizziness    Erectile dysfunction  -Endorses several months of erectile dysfunction, decrease libido  -Symptoms may be in context of resuming SSRI therapy  -Denies smoking tobacco; drinks 3 beers a month  -Provided Psychiatry outpatient referral to tailor medication regimen  -Continuing to monitor; anticipate to improve with decrease SSRI dose    Crohn's disease (HCC)  -Not currently symptomatic; endorses \"I take care of it myself\"  -Previously provided with outpatient referral to GI  -Encouraged to establish with GI; provided phone number; patient amenable    Hypomania (HCC)  -Patient on Paroxetine 20mg daily, Buspar 10mg BID  -Of note, patient also on Trazadone 150mg  -Appears with Hypomania - pressured speech; endorsing sleepless nights with same energy levels; endorses history of recent impulsivity (spending money) and grandiosity (problem solving); flight of ideas; tangential speech; racing thoughts  -Previously referred to Psychiatry outpatient but has not yet established  -Encouraged to please connect with " Psychiatry; provided phone number  -Decreasing Paroxetine dose to 10mg daily  -Element of veronica; in context of 3 serotinergic medications  -Continuing to monitor closely  -Entered new Psychiatry referral as urgent, may need to be considered for a Mood Stabilizer  -Advised patient to please come into clinic as soon as he sees psychiatry and gets his lab work done  -Denies suicidal or homicidal ideation  -Advised patient to call clinic or visit ER if acutely distressed  -Patient denies any stimulant use such as energy drinks, nor coffee, nor medications     All imaging results and lab results and consult notes are reviewed at this visit.  Followup: Return in about 1 month (around 9/15/2023).    Javier Bethea MD  PGY-3 Internal Medicine Resident

## 2023-08-16 PROBLEM — F32.2 CURRENT SEVERE EPISODE OF MAJOR DEPRESSIVE DISORDER WITHOUT PSYCHOTIC FEATURES WITHOUT PRIOR EPISODE (HCC): Status: RESOLVED | Noted: 2020-11-04 | Resolved: 2023-08-16

## 2023-08-18 PROBLEM — F30.8 HYPOMANIA (HCC): Status: ACTIVE | Noted: 2023-08-18

## 2023-08-18 NOTE — ASSESSMENT & PLAN NOTE
-Patient on Paroxetine 20mg daily, Buspar 10mg BID  -Of note, patient also on Trazadone 150mg  -Appears with Hypomania - pressured speech; endorsing sleepless nights with same energy levels; endorses history of recent impulsivity (spending money) and grandiosity (problem solving); flight of ideas; tangential speech; racing thoughts  -Previously referred to Psychiatry outpatient but has not yet established  -Encouraged to please connect with Psychiatry; provided phone number  -Decreasing Paroxetine dose to 10mg daily  -Element of veronica; in context of 3 serotinergic medications  -Continuing to monitor closely  -Entered new Psychiatry referral as urgent, may need to be considered for a Mood Stabilizer  -Advised patient to please come into clinic as soon as he sees psychiatry and gets his lab work done  -Denies suicidal or homicidal ideation  -Advised patient to call clinic or visit ER if acutely distressed  -Patient denies any stimulant use such as energy drinks, nor coffee, nor medications

## 2023-09-08 DIAGNOSIS — F32.2 CURRENT SEVERE EPISODE OF MAJOR DEPRESSIVE DISORDER WITHOUT PSYCHOTIC FEATURES WITHOUT PRIOR EPISODE (HCC): ICD-10-CM

## 2023-09-09 RX ORDER — TRAZODONE HYDROCHLORIDE 150 MG/1
150 TABLET ORAL
Qty: 60 TABLET | Refills: 0 | Status: SHIPPED | OUTPATIENT
Start: 2023-09-09 | End: 2023-11-06

## 2023-11-05 DIAGNOSIS — F32.2 CURRENT SEVERE EPISODE OF MAJOR DEPRESSIVE DISORDER WITHOUT PSYCHOTIC FEATURES WITHOUT PRIOR EPISODE (HCC): ICD-10-CM

## 2023-11-06 ENCOUNTER — OFFICE VISIT (OUTPATIENT)
Dept: INTERNAL MEDICINE | Facility: OTHER | Age: 48
End: 2023-11-06
Payer: COMMERCIAL

## 2023-11-06 VITALS
OXYGEN SATURATION: 93 % | WEIGHT: 256.4 LBS | HEART RATE: 95 BPM | BODY MASS INDEX: 31.22 KG/M2 | SYSTOLIC BLOOD PRESSURE: 139 MMHG | DIASTOLIC BLOOD PRESSURE: 82 MMHG | TEMPERATURE: 96.6 F | HEIGHT: 76 IN

## 2023-11-06 DIAGNOSIS — Z13.228 SCREENING FOR METABOLIC DISORDER: ICD-10-CM

## 2023-11-06 DIAGNOSIS — K50.919 CROHN'S DISEASE WITH COMPLICATION, UNSPECIFIED GASTROINTESTINAL TRACT LOCATION (HCC): ICD-10-CM

## 2023-11-06 DIAGNOSIS — F30.8 HYPOMANIA (HCC): ICD-10-CM

## 2023-11-06 DIAGNOSIS — I10 ESSENTIAL HYPERTENSION: ICD-10-CM

## 2023-11-06 DIAGNOSIS — N52.9 ERECTILE DYSFUNCTION, UNSPECIFIED ERECTILE DYSFUNCTION TYPE: ICD-10-CM

## 2023-11-06 DIAGNOSIS — Z00.00 PREVENTATIVE HEALTH CARE: ICD-10-CM

## 2023-11-06 PROCEDURE — 3075F SYST BP GE 130 - 139MM HG: CPT | Performed by: STUDENT IN AN ORGANIZED HEALTH CARE EDUCATION/TRAINING PROGRAM

## 2023-11-06 PROCEDURE — 99213 OFFICE O/P EST LOW 20 MIN: CPT | Mod: GE,25,U6 | Performed by: STUDENT IN AN ORGANIZED HEALTH CARE EDUCATION/TRAINING PROGRAM

## 2023-11-06 PROCEDURE — 3079F DIAST BP 80-89 MM HG: CPT | Performed by: STUDENT IN AN ORGANIZED HEALTH CARE EDUCATION/TRAINING PROGRAM

## 2023-11-06 PROCEDURE — 90686 IIV4 VACC NO PRSV 0.5 ML IM: CPT | Performed by: STUDENT IN AN ORGANIZED HEALTH CARE EDUCATION/TRAINING PROGRAM

## 2023-11-06 PROCEDURE — 90471 IMMUNIZATION ADMIN: CPT | Performed by: STUDENT IN AN ORGANIZED HEALTH CARE EDUCATION/TRAINING PROGRAM

## 2023-11-06 RX ORDER — TRAZODONE HYDROCHLORIDE 150 MG/1
150 TABLET ORAL
Qty: 60 TABLET | Refills: 0 | Status: SHIPPED | OUTPATIENT
Start: 2023-11-06 | End: 2024-01-18

## 2023-11-06 ASSESSMENT — ENCOUNTER SYMPTOMS
WHEEZING: 0
NAUSEA: 0
HEARTBURN: 0
ABDOMINAL PAIN: 0
DOUBLE VISION: 0
INSOMNIA: 1
ORTHOPNEA: 0
HEMOPTYSIS: 0
CHILLS: 0
BLURRED VISION: 0
FEVER: 0
SHORTNESS OF BREATH: 0
DIZZINESS: 0
NECK PAIN: 0
HEADACHES: 0
VOMITING: 0
MYALGIAS: 0
PALPITATIONS: 0
SPUTUM PRODUCTION: 0
COUGH: 0

## 2023-11-07 NOTE — ASSESSMENT & PLAN NOTE
-Still with pressured speech, racing thoughts, insomnia  -Improved tangential speech since decreased, then discontinued Paroxetine  -Continuing with Buspar 10mg BID  -Continuing with Trazadone 150mg qhs  -Performed motivational interviewing at this visit  -Provided encouragement to establish with Psychiatry  -Close follow-up to ensure patient is connected with Psychiatry  -No alarm symptoms; denies SI/HI

## 2023-11-07 NOTE — PROGRESS NOTES
"     Established Patient    Chief Complaint   Patient presents with    Follow-Up       HISTORY OF PRESENT ILLNESS:     Mr. Mason Craig is our 47 year old male patient with a past medical history that is significant for crohn's disease, hypertension, generalized anxiety disorder, major depressive disorder who presents to our outpatient clinic today for routine follow up. Has been experiencing erectile dysfunction for the past few months which he attributes to racing thoughts, and consequently not being able to \"be in the moment.\" Patient appears more run-down than prior clinic visits, with visible bags under eyes, though still with pressured speech. Patient still not able to get good night's uninterrupted speech. He is not using energy drinks nor does he drink any coffee. At prior visit, downtitrated his Paroxetine from 20mg to 20mg, as we was on three serotinergic medications including also Buspar and Trazadone; patient self discontinued his Paroxetine altogether 2 weeks ago. There is notable improvement in tangential speech since prior visit. Unfortunately, patient has not yet called to establish an appointment with Psychiatry. Performed motivational interviewing at this visit, and patient self determined with some guidance that in order to improve his most worrisome symptoms such as poor sleep and erectile dysfunction, it would be in his best interest to follow up with a specialist in psychiatry. Provided patient again with contact information, and will have close follow-up to ensure this.    Blood pressure today elevated to 139/82, and repeat 139/94. Patient is adherent reportedly to his Lisinopril-HCTX 20-25mg. Attributing elevated blood pressures to patient running to catch appointment, and running outside during encounter before having repeat blood pressure taken. No slurred speech, facial droop, or pronator drift on exam; denies any chest pain, lightheadedness, or dizziness. Will continue to monitor on " subsequent clinic visit.    Has not yet established with outpatient GI to discuss management of his prior diagnosis of Crohn's disease, but denies any recent flare; provided encouragement to please follow up with GI referral and provided information.    Past Medical History:   Diagnosis Date    ADHD     Crohn's disease (HCC)     Hypertension     conyones        Patient Active Problem List    Diagnosis Date Noted    Preventative health care 11/06/2023    Hypomania (HCC) 08/18/2023    Screening for metabolic disorder 08/15/2023    Erectile dysfunction 08/15/2023    Insomnia 12/05/2022    Encounter for routine adult health examination 12/05/2022    SHEY (generalized anxiety disorder) 08/20/2021    Crohn's disease (HCC) 11/04/2020    Essential hypertension 11/04/2020       Allergies:Patient has no known allergies.    Current Outpatient Medications   Medication Sig Dispense Refill    traZODone (DESYREL) 150 MG Tab TAKE 1 TABLET BY MOUTH AT BEDTIME 60 Tablet 0    busPIRone (BUSPAR) 10 MG Tab tablet TAKE 1/2 (ONE-HALF) TABLET BY MOUTH TWICE DAILY FOR 7 DAYS THEN TAKE 1 TABLET TWICE DAILY 90 Tablet 3    lisinopril-hydrochlorothiazide (PRINZIDE) 20-25 MG per tablet Take 1 tablet by mouth once daily 30 Tablet 3    cyclobenzaprine (FLEXERIL) 5 mg tablet Take 1 Tablet by mouth 2 times a day as needed for Muscle Spasms or Moderate Pain. 30 Tablet 0    Blood Pressure Monitoring (ADULT BLOOD PRESSURE CUFF LG) Kit 1 Kit 2 times a day as needed (Check Blood Pressure). 1 Kit 0     No current facility-administered medications for this visit.       Social History     Tobacco Use    Smoking status: Never    Smokeless tobacco: Never   Vaping Use    Vaping Use: Never used   Substance Use Topics    Alcohol use: Yes     Comment: occ     Drug use: No       Family History   Problem Relation Age of Onset    No Known Problems Mother     No Known Problems Father     Cancer Neg Hx     Diabetes Neg Hx     Hypertension Neg Hx     Hyperlipidemia Neg Hx  "    Stroke Neg Hx     Heart Disease Neg Hx          Review of Systems   Constitutional:  Negative for chills, fever and malaise/fatigue.   HENT:  Negative for ear pain, hearing loss and tinnitus.    Eyes:  Negative for blurred vision and double vision.   Respiratory:  Negative for cough, hemoptysis, sputum production, shortness of breath and wheezing.    Cardiovascular:  Negative for chest pain, palpitations and orthopnea.   Gastrointestinal:  Negative for abdominal pain, heartburn, nausea and vomiting.   Genitourinary:  Negative for dysuria, frequency and urgency.   Musculoskeletal:  Negative for myalgias and neck pain.   Skin:  Negative for itching and rash.   Neurological:  Negative for dizziness and headaches.   Psychiatric/Behavioral:  The patient has insomnia.        Exam:  /82 (BP Location: Left arm, Patient Position: Sitting, BP Cuff Size: Large adult)   Pulse 95   Temp 35.9 °C (96.6 °F) (Temporal)   Ht 1.93 m (6' 4\")   Wt 116 kg (256 lb 6.4 oz)   SpO2 93%  Body mass index is 31.21 kg/m².    Constitutional:  Appears more \"run-down\" with circles under eyes  HEENT:   Normocephalic, atraumatic.  Cardiovascular: S1, S2; Regular rate and rhythm; No murmurs/rubs/gallops.  Lungs:   Clear to auscultation bilaterally; No wheezes/rhales/rhonchi; No respiratory distress.  Abdomen: Soft, nondistended, not tender to palpation; no guarding no rigidity; no masses.  Extremities:  No cyanosis/clubbing/edema; No obvious deformities.  Skin:  Warm and dry.  No visible rashes.  Neurologic: Alert Awake & Oriented x 3, CN II-XII grossly intact; strength and sensation grossly intact.  No focal deficits noted.  Psychiatric:  Improved pressured speech, no longer tangential in conversation at this time    Assessment/Plan:   Mr. Mason Craig is our 47 year old male patient with a past medical history that is significant for crohn's disease, hypertension, generalized anxiety disorder, major depressive disorder who presents to " "our outpatient clinic today for routine follow up.     Screening for metabolic disorder  -Encouraged patient to please have his outpatient lab work done for review on subsequent clinic appointment    Essential hypertension  -Blood pressure today elevated to 139/82, and repeat 139/94  -Self reportedly adherent o Lisinopril-HCTX 20-25mg  -Attributing elevated blood pressures to patient running to catch appointment, and running outside during encounter before having repeat blood pressure taken  -No slurred speech, facial droop, or pronator drift on exam; denies any chest pain, lightheadedness, or dizziness  -Will continue to monitor on subsequent clinic visit    Hypomania (HCC)  -Still with pressured speech, racing thoughts, insomnia  -Improved tangential speech since decreased, then discontinued Paroxetine  -Continuing with Buspar 10mg BID  -Continuing with Trazadone 150mg qhs  -Performed motivational interviewing at this visit  -Provided encouragement to establish with Psychiatry  -Close follow-up to ensure patient is connected with Psychiatry  -No alarm symptoms; denies SI/HI    Erectile dysfunction  -May be in context of psychiatric issues; patient self discontinued Paroxetine, but still experiencing erectile dysfunction for the past few months which he attributes to racing thoughts, and consequently not being able to \"be in the moment\"  -Continue to monitor with review of labs and post-establishing with Psychiatry    Crohn's disease (HCC)  -Encouraged to please follow up with outpatient GI referral  -No symptoms at this time    Preventative health care  -Administered Influenza Vaccine at this visit     All imaging results and lab results and consult notes are reviewed at this visit.  Followup: No follow-ups on file.    Javier Bethea MD  PGY-3 Internal Medicine Resident   "

## 2023-11-07 NOTE — ASSESSMENT & PLAN NOTE
-Encouraged patient to please have his outpatient lab work done for review on subsequent clinic appointment

## 2023-11-07 NOTE — PATIENT INSTRUCTIONS
-Please follow up with outpatient labs  -Please follow up with Psychiatry referral  -Please follow up with clinic in 1 month

## 2023-11-07 NOTE — ASSESSMENT & PLAN NOTE
"-May be in context of psychiatric issues; patient self discontinued Paroxetine, but still experiencing erectile dysfunction for the past few months which he attributes to racing thoughts, and consequently not being able to \"be in the moment\"  -Continue to monitor with review of labs and post-establishing with Psychiatry  "

## 2023-11-07 NOTE — ASSESSMENT & PLAN NOTE
-Blood pressure today elevated to 139/82, and repeat 139/94  -Self reportedly adherent o Lisinopril-HCTX 20-25mg  -Attributing elevated blood pressures to patient running to catch appointment, and running outside during encounter before having repeat blood pressure taken  -No slurred speech, facial droop, or pronator drift on exam; denies any chest pain, lightheadedness, or dizziness  -Will continue to monitor on subsequent clinic visit

## 2023-12-07 DIAGNOSIS — F32.2 CURRENT SEVERE EPISODE OF MAJOR DEPRESSIVE DISORDER WITHOUT PSYCHOTIC FEATURES WITHOUT PRIOR EPISODE (HCC): ICD-10-CM

## 2023-12-07 DIAGNOSIS — F41.1 GAD (GENERALIZED ANXIETY DISORDER): ICD-10-CM

## 2023-12-08 RX ORDER — PAROXETINE HYDROCHLORIDE 20 MG/1
TABLET, FILM COATED ORAL
Qty: 60 TABLET | Refills: 0 | Status: SHIPPED | OUTPATIENT
Start: 2023-12-08 | End: 2023-12-19

## 2023-12-08 NOTE — TELEPHONE ENCOUNTER
Please advise for pt's rx, it seems that the pharmacy is requesting renewals but you discontinued this medication in June.

## 2023-12-19 ENCOUNTER — OFFICE VISIT (OUTPATIENT)
Dept: INTERNAL MEDICINE | Facility: OTHER | Age: 48
End: 2023-12-19
Payer: COMMERCIAL

## 2023-12-19 VITALS
HEIGHT: 76 IN | HEART RATE: 86 BPM | TEMPERATURE: 96.9 F | DIASTOLIC BLOOD PRESSURE: 91 MMHG | OXYGEN SATURATION: 91 % | WEIGHT: 252.4 LBS | BODY MASS INDEX: 30.74 KG/M2 | SYSTOLIC BLOOD PRESSURE: 139 MMHG

## 2023-12-19 DIAGNOSIS — F30.8 HYPOMANIA (HCC): ICD-10-CM

## 2023-12-19 DIAGNOSIS — I10 ESSENTIAL HYPERTENSION: ICD-10-CM

## 2023-12-19 DIAGNOSIS — Z13.228 SCREENING FOR METABOLIC DISORDER: ICD-10-CM

## 2023-12-19 DIAGNOSIS — F32.2 CURRENT SEVERE EPISODE OF MAJOR DEPRESSIVE DISORDER WITHOUT PSYCHOTIC FEATURES WITHOUT PRIOR EPISODE (HCC): ICD-10-CM

## 2023-12-19 PROCEDURE — 99214 OFFICE O/P EST MOD 30 MIN: CPT | Mod: GC | Performed by: STUDENT IN AN ORGANIZED HEALTH CARE EDUCATION/TRAINING PROGRAM

## 2023-12-19 PROCEDURE — 3080F DIAST BP >= 90 MM HG: CPT | Performed by: STUDENT IN AN ORGANIZED HEALTH CARE EDUCATION/TRAINING PROGRAM

## 2023-12-19 PROCEDURE — 3075F SYST BP GE 130 - 139MM HG: CPT | Performed by: STUDENT IN AN ORGANIZED HEALTH CARE EDUCATION/TRAINING PROGRAM

## 2023-12-19 RX ORDER — PAROXETINE 10 MG/1
10 TABLET, FILM COATED ORAL DAILY
Qty: 30 TABLET | Refills: 3 | Status: SHIPPED | OUTPATIENT
Start: 2023-12-19 | End: 2023-12-19

## 2023-12-19 RX ORDER — PAROXETINE 10 MG/1
10 TABLET, FILM COATED ORAL DAILY
Qty: 15 TABLET | Refills: 1 | Status: SHIPPED | OUTPATIENT
Start: 2023-12-19 | End: 2024-02-06

## 2023-12-19 ASSESSMENT — LIFESTYLE VARIABLES: SUBSTANCE_ABUSE: 0

## 2023-12-19 ASSESSMENT — ENCOUNTER SYMPTOMS
ORTHOPNEA: 0
HEMOPTYSIS: 0
PALPITATIONS: 0
SPUTUM PRODUCTION: 0
DIARRHEA: 0
ABDOMINAL PAIN: 0
HALLUCINATIONS: 0
FEVER: 0
HEARTBURN: 0
INSOMNIA: 1
CONSTIPATION: 0
NAUSEA: 0
VOMITING: 0
EYES NEGATIVE: 1
SHORTNESS OF BREATH: 0
CHILLS: 0
COUGH: 0
NERVOUS/ANXIOUS: 1
DEPRESSION: 0

## 2023-12-19 NOTE — PATIENT INSTRUCTIONS
-Please take your 5mg Paroxetine daily for 3 weeks; 4th week take it every other day; 5th week stop entirely  -Please get your outpatients  -Return to clinic in 1 month

## 2023-12-19 NOTE — ASSESSMENT & PLAN NOTE
-Still with pressured speech, racing thoughts, insomnia  -Improved tangential speech since decreased, then discontinued Paroxetine  -Continuing with Buspar 10mg BID  -Continuing with Trazadone 150mg qhs; consider downtitrating at subsequent visit once weaned off of SSRI and evaluating for symptom improvement  -Patient self-resumed Paroxetine 10mg daily; at this time, downtitrating to 5mg daily for 3 weeks, 4th week alternating days, 5th week off completely  -Pending appointment to establish with Psychiatry with urgent referral  -No alarm symptoms; denies SI/HI

## 2023-12-19 NOTE — ASSESSMENT & PLAN NOTE
-Blood pressure today 139/91  -Self reportedly adherent to Lisinopril-HCTX 20-25mg  -Patient has not taken his pill yet today; takes at night; switching to mornings  -No slurred speech, facial droop, or pronator drift on exam; denies any chest pain, lightheadedness, or dizziness  -Monitor on subsequent clinic visit

## 2023-12-20 ENCOUNTER — TELEPHONE (OUTPATIENT)
Dept: INTERNAL MEDICINE | Facility: OTHER | Age: 48
End: 2023-12-20
Payer: COMMERCIAL

## 2023-12-20 NOTE — TELEPHONE ENCOUNTER
Eduardo ENCINAS,  The pharmacy would like to clarify if their paroxetine medication should be 1/2 tab daily or is it 1 tab?    Thanks,  Maryellen Kumar, Med Ass't

## 2024-01-08 RX ORDER — LISINOPRIL AND HYDROCHLOROTHIAZIDE 25; 20 MG/1; MG/1
TABLET ORAL
Qty: 30 TABLET | Refills: 0 | Status: SHIPPED | OUTPATIENT
Start: 2024-01-08 | End: 2024-02-13

## 2024-01-16 DIAGNOSIS — F32.2 CURRENT SEVERE EPISODE OF MAJOR DEPRESSIVE DISORDER WITHOUT PSYCHOTIC FEATURES WITHOUT PRIOR EPISODE (HCC): ICD-10-CM

## 2024-01-18 RX ORDER — TRAZODONE HYDROCHLORIDE 150 MG/1
150 TABLET ORAL
Qty: 60 TABLET | Refills: 0 | Status: SHIPPED | OUTPATIENT
Start: 2024-01-18 | End: 2024-02-06

## 2024-01-31 ENCOUNTER — APPOINTMENT (OUTPATIENT)
Dept: INTERNAL MEDICINE | Facility: OTHER | Age: 49
End: 2024-01-31
Payer: COMMERCIAL

## 2024-02-06 ENCOUNTER — OFFICE VISIT (OUTPATIENT)
Dept: INTERNAL MEDICINE | Facility: OTHER | Age: 49
End: 2024-02-06
Payer: COMMERCIAL

## 2024-02-06 VITALS
DIASTOLIC BLOOD PRESSURE: 74 MMHG | SYSTOLIC BLOOD PRESSURE: 134 MMHG | HEIGHT: 76 IN | HEART RATE: 86 BPM | WEIGHT: 256.6 LBS | OXYGEN SATURATION: 92 % | TEMPERATURE: 95.4 F | BODY MASS INDEX: 31.25 KG/M2

## 2024-02-06 DIAGNOSIS — F41.1 GAD (GENERALIZED ANXIETY DISORDER): ICD-10-CM

## 2024-02-06 DIAGNOSIS — I10 HYPERTENSION, UNSPECIFIED TYPE: ICD-10-CM

## 2024-02-06 DIAGNOSIS — G47.00 INSOMNIA, UNSPECIFIED TYPE: ICD-10-CM

## 2024-02-06 DIAGNOSIS — F30.8 HYPOMANIA (HCC): ICD-10-CM

## 2024-02-06 PROCEDURE — 99214 OFFICE O/P EST MOD 30 MIN: CPT | Mod: GC | Performed by: STUDENT IN AN ORGANIZED HEALTH CARE EDUCATION/TRAINING PROGRAM

## 2024-02-06 PROCEDURE — 3078F DIAST BP <80 MM HG: CPT | Mod: GC | Performed by: STUDENT IN AN ORGANIZED HEALTH CARE EDUCATION/TRAINING PROGRAM

## 2024-02-06 PROCEDURE — 3075F SYST BP GE 130 - 139MM HG: CPT | Mod: GC | Performed by: STUDENT IN AN ORGANIZED HEALTH CARE EDUCATION/TRAINING PROGRAM

## 2024-02-06 RX ORDER — TRAZODONE HYDROCHLORIDE 100 MG/1
100 TABLET ORAL NIGHTLY
Qty: 30 TABLET | Refills: 2 | Status: SHIPPED | OUTPATIENT
Start: 2024-02-06 | End: 2024-03-28 | Stop reason: SDUPTHER

## 2024-02-06 RX ORDER — PRAZOSIN HYDROCHLORIDE 1 MG/1
1 CAPSULE ORAL NIGHTLY
Qty: 30 CAPSULE | Refills: 0 | Status: SHIPPED | OUTPATIENT
Start: 2024-02-06 | End: 2024-03-12 | Stop reason: SDUPTHER

## 2024-02-06 ASSESSMENT — ENCOUNTER SYMPTOMS
NERVOUS/ANXIOUS: 1
VOMITING: 0
NECK PAIN: 0
CHILLS: 0
DEPRESSION: 1
EYES NEGATIVE: 1
COUGH: 0
MYALGIAS: 0
ABDOMINAL PAIN: 0
HEMOPTYSIS: 0
HEADACHES: 0
WHEEZING: 0
FEVER: 0
ORTHOPNEA: 0
SPUTUM PRODUCTION: 0
BACK PAIN: 0
SHORTNESS OF BREATH: 0
DIZZINESS: 0
NAUSEA: 0
HEARTBURN: 0
PALPITATIONS: 0

## 2024-02-06 ASSESSMENT — PATIENT HEALTH QUESTIONNAIRE - PHQ9: CLINICAL INTERPRETATION OF PHQ2 SCORE: 0

## 2024-02-06 NOTE — PATIENT INSTRUCTIONS
-Please follow up with outpatient labs  -Please start taking your LOWER dose of Trazadone 100mg  -Please start taking your low dose Prazosin 1mg nightly  -Please DO NOT take any more Paroxetine  -Please follow up with Psychiatry referral  -Follow up with clinic in 1 month    **You are to take PRAZOSIN nightly; DO NOT TAKE any paroxetine**

## 2024-02-06 NOTE — PROGRESS NOTES
Teaching Physician Attestation      Level of Participation    I have personally interviewed and examined the patient.  In addition, I discussed with the resident physician the patient's history, exam, assessment and plan in detail.  Topics listed in my addendum were the focus of the visit.  Healthcare maintenance was not addressed this visit unless listed as a topic in my addendum.  I agree with the plan as written along with the following additions/modifications:    Hypomania in setting of likely bipolar  -no si/hi.  Visibly improved hypomania compared to prior visit, patient also reports he is feeling better status post discontinuation of Paxil.  His speech is slightly pressured but he can be interrupted, he is dressed appropriately, no flight of ideas or tangential thought processes.  Does report some issues with sleep specifically with nightmares happening every night which wake him up from sleep.  He denies any clear inciting traumatic event for this, however concerned that the trazodone he is currently on and also potentially the BuSpar are somewhat counterproductive and that there serotonergic effects may be contributing to veronica.    Plan  -Decrease trazodone to 100 mg nightly.  Trial low-dose of prazosin 1 mg nightly.  -Continue BuSpar for now  -Does have upcoming psychiatry appointment in April, appreciate support  -Follow-up 5 weeks        HTN  -no sx, bp near goal  -offered bp cuff in past for home monitoring, has not done to date  -continue prinzide  -check bmp and keyana/cr for monitoring    Pcr.

## 2024-02-06 NOTE — ASSESSMENT & PLAN NOTE
"-Patient endorses that since experiencing Covid, he feels that \"my mind has been so different\" and he has been struggling with insomnia which is primarily due to waking up from frequent nightmares  -Endorses multiple nighttime awakenings almost every night  -Has been on Trazadone 150mg qhs; taking routinely  -Granted serotinergic MOA of Trazadone, being activating, at this time after thorough discussion and [id number] with patient decided to downtitrate Trazadone to 100mg qhs and initiate a trial of low dose Prazosin 1mg qhs  -Close follow up in 1 month  "

## 2024-02-06 NOTE — ASSESSMENT & PLAN NOTE
-Continues with Buspar 10mg daily  -Psychiatry appointment scheduled some time in April; on call-list if an earlier time opens up  -Denies any suicidal ideation or homicidal ideation; no firearms at home

## 2024-02-06 NOTE — ASSESSMENT & PLAN NOTE
"-Problems with sleep, nightmares and nighttime awakenings. Endorses that he is having nightmares, which cause him to wake up every night. Still, however, patient endorses \"energy levels are great\"  -Denies any SI HI or firearms at home; denies any excessive gift-giving or money spending that he had endorsed on an earlier visit; appears to be slightly less tangential still with pressured speech today  -Decreasing dose of Trazadone 150mg to 100mg due to serotinergic MOA  "

## 2024-02-06 NOTE — PROGRESS NOTES
"     Established Patient    Chief Complaint   Patient presents with    Medication Follow-up     Paroxetine follow up - pt is harder to sleep or interrupted        HISTORY OF PRESENT ILLNESS:     Mr. Mason Craig is our 47 year old male patient with a past medical history that is significant for crohn's disease, hypertension, generalized anxiety disorder, major depressive disorder who presents to our outpatient clinic today for routine follow up.    Problems with sleep, nightmares and nighttime awakenings. Endorses that he is having nightmares, which cause him to wake up every night. Continues with Trazadone 150mg qhs; continues taking his Buspar 10mg daily. Energy levels are \"great.\" Discontinued Paroxetine as of today. Psychiatry appointment scheduled some time in April; on call-list if an earlier time opens up. Denies any suicidal ideation or homicidal ideation; no firearms at home.    Patient mentions he experienced Covid, and did not feel the same as a person, generally, thereafter. He considers this experience to be a traumatic event, a point in time after which his symptoms started. At this time, will be trialing downtitrating Trazadone 150mg to 100mg qhs and starting a low dose Prazosin.    Blood pressure 134/74 today. Adherent to his Lisinopril-HCTZ 20-25mg daily.    Pending outpatient labs: Microalbumin-Cr; HgbA1c; Lipid Profile; CMP    Past Medical History:   Diagnosis Date    ADHD     Crohn's disease (HCC)     Hypertension     krones        Patient Active Problem List    Diagnosis Date Noted    Preventative health care 11/06/2023    Hypomania (HCC) 08/18/2023    Screening for metabolic disorder 08/15/2023    Erectile dysfunction 08/15/2023    Insomnia 12/05/2022    Encounter for routine adult health examination 12/05/2022    SHEY (generalized anxiety disorder) 08/20/2021    Crohn's disease (HCC) 11/04/2020    Essential hypertension 11/04/2020     Allergies: Patient has no known allergies.    Current " "Outpatient Medications   Medication Sig Dispense Refill    prazosin (MINIPRESS) 1 MG Cap Take 1 Capsule by mouth every evening. 30 Capsule 0    lisinopril-hydrochlorothiazide (PRINZIDE) 20-25 MG per tablet Take 1 tablet by mouth once daily 30 Tablet 0    busPIRone (BUSPAR) 10 MG Tab tablet TAKE 1/2 (ONE-HALF) TABLET BY MOUTH TWICE DAILY FOR 7 DAYS THEN TAKE 1 TABLET TWICE DAILY 90 Tablet 3    cyclobenzaprine (FLEXERIL) 5 mg tablet Take 1 Tablet by mouth 2 times a day as needed for Muscle Spasms or Moderate Pain. 30 Tablet 0     No current facility-administered medications for this visit.       Social History     Tobacco Use    Smoking status: Never    Smokeless tobacco: Never   Vaping Use    Vaping Use: Never used   Substance Use Topics    Alcohol use: Yes     Comment: occ     Drug use: No       Family History   Problem Relation Age of Onset    No Known Problems Mother     No Known Problems Father     Cancer Neg Hx     Diabetes Neg Hx     Hypertension Neg Hx     Hyperlipidemia Neg Hx     Stroke Neg Hx     Heart Disease Neg Hx          Review of Systems   Constitutional:  Negative for chills, fever and malaise/fatigue.   HENT: Negative.     Eyes: Negative.    Respiratory:  Negative for cough, hemoptysis, sputum production, shortness of breath and wheezing.    Cardiovascular:  Negative for chest pain, palpitations and orthopnea.   Gastrointestinal:  Negative for abdominal pain, heartburn, nausea and vomiting.   Genitourinary:  Negative for dysuria, frequency and urgency.   Musculoskeletal:  Negative for back pain, myalgias and neck pain.   Skin:  Negative for itching and rash.   Neurological:  Negative for dizziness and headaches.   Psychiatric/Behavioral:  Positive for depression. The patient is nervous/anxious.        Exam:  /74 (BP Location: Left arm, Patient Position: Sitting, BP Cuff Size: Adult)   Pulse 86   Temp (!) 35.2 °C (95.4 °F) (Temporal)   Ht 1.93 m (6' 4\")   Wt 116 kg (256 lb 9.6 oz)   SpO2 " "92%  Body mass index is 31.23 kg/m².    Constitutional:  Not in acute distress, well appearing.  HEENT:   Normocephalic, atraumatic.  Cardiovascular: S1, S2; Regular rate and rhythm; No murmurs/rubs/gallops.  Lungs:   Clear to auscultation bilaterally; No wheezes/rhales/rhonchi; No respiratory distress.  Abdomen: Soft, nondistended, not tender to palpation; no guarding no rigidity; no masses.  Extremities:  No cyanosis/clubbing/edema; No obvious deformities.  Skin:  Warm and dry.  No visible rashes.  Neurologic: Alert Awake & Oriented x 3, CN II-XII grossly intact; strength and sensation grossly intact.  No focal deficits noted.  Psychiatric:  Affect normal, mood normal, judgment normal.    Assessment/Plan:   Mr. Mason Craig is our 47 year old male patient with a past medical history that is significant for crohn's disease, hypertension, generalized anxiety disorder, major depressive disorder who presents to our outpatient clinic today for routine follow up.    Insomnia  -Patient endorses that since experiencing Covid, he feels that \"my mind has been so different\" and he has been struggling with insomnia which is primarily due to waking up from frequent nightmares  -Endorses multiple nighttime awakenings almost every night  -Has been on Trazadone 150mg qhs; taking routinely  -Granted serotinergic MOA of Trazadone, being activating, at this time after thorough discussion and [id number] with patient decided to downtitrate Trazadone to 100mg qhs and initiate a trial of low dose Prazosin 1mg qhs  -Close follow up in 1 month    SHEY (generalized anxiety disorder)  -Continues with Buspar 10mg daily  -Psychiatry appointment scheduled some time in April; on call-list if an earlier time opens up  -Denies any suicidal ideation or homicidal ideation; no firearms at home    Hypomania (HCC)  -Problems with sleep, nightmares and nighttime awakenings. Endorses that he is having nightmares, which cause him to wake up " "every night. Still, however, patient endorses \"energy levels are great\"  -Denies any SI HI or firearms at home; denies any excessive gift-giving or money spending that he had endorsed on an earlier visit; appears to be slightly less tangential still with pressured speech today  -Decreasing dose of Trazadone 150mg to 100mg due to serotinergic MOA    All imaging results and lab results and consult notes are reviewed at this visit.  Followup: No follow-ups on file.    Javier Bethea MD  PGY-3 Internal Medicine Resident  "

## 2024-02-13 RX ORDER — LISINOPRIL AND HYDROCHLOROTHIAZIDE 25; 20 MG/1; MG/1
TABLET ORAL
Qty: 30 TABLET | Refills: 3 | Status: SHIPPED | OUTPATIENT
Start: 2024-02-13 | End: 2024-03-28 | Stop reason: SDUPTHER

## 2024-02-28 DIAGNOSIS — F41.1 GAD (GENERALIZED ANXIETY DISORDER): ICD-10-CM

## 2024-02-29 RX ORDER — BUSPIRONE HYDROCHLORIDE 10 MG/1
TABLET ORAL
Qty: 90 TABLET | Refills: 3 | Status: SHIPPED | OUTPATIENT
Start: 2024-02-29

## 2024-02-29 NOTE — TELEPHONE ENCOUNTER
Received request via: Patient    Was the patient seen in the last year in this department? Yes    Does the patient have an active prescription (recently filled or refills available) for medication(s) requested? No    Pharmacy Name: [facility]    Does the patient have residential Plus and need 100 day supply (blood pressure, diabetes and cholesterol meds only)? Patient does not have SCP

## 2024-03-04 ENCOUNTER — PATIENT MESSAGE (OUTPATIENT)
Dept: INTERNAL MEDICINE | Facility: OTHER | Age: 49
End: 2024-03-04
Payer: COMMERCIAL

## 2024-03-11 NOTE — PATIENT COMMUNICATION
Phone Number Called: [phone] (home)       Call outcome:  VM Not set up    Message: Tried to call patient to get the information for Dr. Bethea since I was on vacation last week but voicemail was not set up. Will try again tomorrow.

## 2024-03-12 ENCOUNTER — PATIENT MESSAGE (OUTPATIENT)
Dept: INTERNAL MEDICINE | Facility: OTHER | Age: 49
End: 2024-03-12
Payer: COMMERCIAL

## 2024-03-12 NOTE — PATIENT COMMUNICATION
Phone Number Called: [phone] (home)       Call outcome: VM Not set up    Message: Tried to call patient to get the information for Dr. Bethea but VM not set up. Sending pt message on [employer] and closing encounter.

## 2024-03-13 RX ORDER — TRAZODONE HYDROCHLORIDE 100 MG/1
100 TABLET ORAL NIGHTLY
Qty: 30 TABLET | Refills: 2 | OUTPATIENT
Start: 2024-03-13

## 2024-03-13 RX ORDER — LISINOPRIL AND HYDROCHLOROTHIAZIDE 25; 20 MG/1; MG/1
1 TABLET ORAL DAILY
Qty: 30 TABLET | Refills: 3 | OUTPATIENT
Start: 2024-03-13

## 2024-03-13 NOTE — TELEPHONE ENCOUNTER
Received request via: Patient    Was the patient seen in the last year in this department? Yes    Does the patient have an active prescription (recently filled or refills available) for medication(s) requested?  Refill request denied for  trazodone and Lisinopril-hydrochlorothiazide (too soon), need refill on prazosin     Pharmacy Name: [facility], [address]     Does the patient have group home Plus and need 100 day supply (blood pressure, diabetes and cholesterol meds only)? Patient does not have SCP

## 2024-03-14 NOTE — PATIENT COMMUNICATION
Received request via: Patient    Was the patient seen in the last year in this department? Yes    Does the patient have an active prescription (recently filled or refills available) for medication(s) requested? No    Pharmacy Name: [facility]    Does the patient have care home Plus and need 100 day supply (blood pressure, diabetes and cholesterol meds only)? Patient does not have SCP

## 2024-03-18 RX ORDER — PRAZOSIN HYDROCHLORIDE 1 MG/1
1 CAPSULE ORAL NIGHTLY
Qty: 30 CAPSULE | Refills: 3
Start: 2024-03-18

## 2024-03-18 RX ORDER — PRAZOSIN HYDROCHLORIDE 1 MG/1
1 CAPSULE ORAL NIGHTLY
Qty: 30 CAPSULE | Refills: 0 | Status: SHIPPED | OUTPATIENT
Start: 2024-03-18

## 2024-03-28 ENCOUNTER — OFFICE VISIT (OUTPATIENT)
Dept: INTERNAL MEDICINE | Facility: OTHER | Age: 49
End: 2024-03-28
Payer: COMMERCIAL

## 2024-03-28 VITALS
HEART RATE: 90 BPM | BODY MASS INDEX: 30.56 KG/M2 | DIASTOLIC BLOOD PRESSURE: 90 MMHG | WEIGHT: 251 LBS | TEMPERATURE: 98 F | HEIGHT: 76 IN | OXYGEN SATURATION: 90 % | SYSTOLIC BLOOD PRESSURE: 150 MMHG

## 2024-03-28 DIAGNOSIS — Z13.228 SCREENING FOR METABOLIC DISORDER: ICD-10-CM

## 2024-03-28 DIAGNOSIS — K43.9 EPIGASTRIC HERNIA: ICD-10-CM

## 2024-03-28 DIAGNOSIS — Z11.4 SCREENING FOR HIV (HUMAN IMMUNODEFICIENCY VIRUS): ICD-10-CM

## 2024-03-28 DIAGNOSIS — G47.00 INSOMNIA, UNSPECIFIED TYPE: ICD-10-CM

## 2024-03-28 DIAGNOSIS — K50.919 CROHN'S DISEASE WITH COMPLICATION, UNSPECIFIED GASTROINTESTINAL TRACT LOCATION (HCC): ICD-10-CM

## 2024-03-28 DIAGNOSIS — I10 HYPERTENSION, UNSPECIFIED TYPE: ICD-10-CM

## 2024-03-28 PROBLEM — Z00.00 ENCOUNTER FOR ROUTINE ADULT HEALTH EXAMINATION: Status: RESOLVED | Noted: 2022-12-05 | Resolved: 2024-03-28

## 2024-03-28 PROBLEM — Z00.00 PREVENTATIVE HEALTH CARE: Status: RESOLVED | Noted: 2023-11-06 | Resolved: 2024-03-28

## 2024-03-28 RX ORDER — TRAZODONE HYDROCHLORIDE 100 MG/1
100 TABLET ORAL NIGHTLY
Qty: 30 TABLET | Refills: 2 | Status: SHIPPED | OUTPATIENT
Start: 2024-03-28

## 2024-03-28 RX ORDER — LISINOPRIL AND HYDROCHLOROTHIAZIDE 25; 20 MG/1; MG/1
1 TABLET ORAL DAILY
Qty: 30 TABLET | Refills: 3 | Status: SHIPPED | OUTPATIENT
Start: 2024-03-28

## 2024-03-28 ASSESSMENT — ENCOUNTER SYMPTOMS
GASTROINTESTINAL NEGATIVE: 1
EYES NEGATIVE: 1
NEUROLOGICAL NEGATIVE: 1
RESPIRATORY NEGATIVE: 1
MUSCULOSKELETAL NEGATIVE: 1
CARDIOVASCULAR NEGATIVE: 1
PSYCHIATRIC NEGATIVE: 1
CONSTITUTIONAL NEGATIVE: 1

## 2024-03-28 NOTE — PROGRESS NOTES
"    Established Patient    Patient Care Team:  Javier Bethea M.D. as PCP - General (Internal Medicine)    Mason Craig is a 49 y.o. male who presents today with the following Chief Complaint(s): Follow up for Diagnoses of Epigastric hernia, Hypertension, unspecified type, Insomnia, unspecified type, Screening for HIV (human immunodeficiency virus), Screening for metabolic disorder, and Crohn's disease with complication, unspecified gastrointestinal tract location (HCC) were pertinent to this visit.    HPI:  Mason Craig is a 48 y/o male patient who visited today with complaints abdominal distention. Pt reports noticing an epigastric abdominal distention that he noticed 10 days ago when he was laying down, he looked up and noticed the protrusion. Pt reports some nausea since occurrence, denies vomiting, diarrhea. Reports constipation over the last 5-6 bowel movements. Denies melena, hematochezia. Pt does not have pain at the site, but mentions discomfort + nausea with palpation of the protrusion, with some abdominal cramping. Denies fevers, chills, headaches, weakness, fatigue. No recent changes in diet. Pt denies chronic cough, heavy weight lifting. Pt reports constipation occurring AFTER the abdominal distension began. Patient denies other concerns at this visit.     BP (!) 150/90 (BP Location: Right arm, Patient Position: Sitting, BP Cuff Size: Adult)   Pulse 90   Temp 36.7 °C (98 °F) (Temporal)   Ht 1.93 m (6' 4\")   Wt 114 kg (251 lb)   SpO2 90%   BMI 30.55 kg/m²   Review of Systems   Constitutional: Negative.    HENT: Negative.     Eyes: Negative.    Respiratory: Negative.     Cardiovascular: Negative.    Gastrointestinal: Negative.    Genitourinary: Negative.    Musculoskeletal: Negative.    Skin: Negative.    Neurological: Negative.    Endo/Heme/Allergies: Negative.    Psychiatric/Behavioral: Negative.         Past Medical History:   Diagnosis Date    ADHD     Crohn's disease (HCC)     " Hypertension     krones      Social History     Tobacco Use    Smoking status: Never    Smokeless tobacco: Never   Vaping Use    Vaping Use: Never used   Substance Use Topics    Alcohol use: Yes     Comment: occ     Drug use: No     Current Outpatient Medications   Medication Sig Dispense Refill    lisinopril-hydrochlorothiazide (PRINZIDE) 20-25 MG per tablet Take 1 Tablet by mouth every day. 30 Tablet 3    traZODone (DESYREL) 100 MG Tab Take 1 Tablet by mouth every evening. 30 Tablet 2    prazosin (MINIPRESS) 1 MG Cap Take 1 Capsule by mouth every evening. 30 Capsule 0    busPIRone (BUSPAR) 10 MG Tab tablet TAKE 1/2 (ONE-HALF) TABLET BY MOUTH TWICE DAILY FOR 7 DAYS THEN TAKE 1 TABLET TWICE DAILY 90 Tablet 3    cyclobenzaprine (FLEXERIL) 5 mg tablet Take 1 Tablet by mouth 2 times a day as needed for Muscle Spasms or Moderate Pain. 30 Tablet 0     No current facility-administered medications for this visit.         Physical Exam  Constitutional:       General: He is in acute distress.      Appearance: Normal appearance.   HENT:      Right Ear: Tympanic membrane and ear canal normal.      Left Ear: Tympanic membrane and ear canal normal.      Nose: Nose normal.   Cardiovascular:      Rate and Rhythm: Normal rate and regular rhythm.      Pulses: Normal pulses.      Heart sounds: Normal heart sounds.   Pulmonary:      Effort: Pulmonary effort is normal.      Breath sounds: Normal breath sounds.   Abdominal:      General: Bowel sounds are normal. There is distension.      Palpations: Abdomen is soft.      Hernia: A hernia is present.   Musculoskeletal:         General: Normal range of motion.      Cervical back: Normal range of motion and neck supple.   Skin:     General: Skin is warm.   Neurological:      General: No focal deficit present.      Mental Status: He is alert and oriented to person, place, and time. Mental status is at baseline.   Psychiatric:         Mood and Affect: Mood normal.         Thought Content:  Thought content normal.         Judgment: Judgment normal.       Assessment and Plan:   1. Epigastric hernia  Pt presented with central abdominal hernia, in the epigastric region that has been present for 10 days. Denies excess weight bearing, chronic cough, other valsalva provoking maneuvers. Denies associated pain, vomiting. Reports constipation for last 6 days. Pt would like to have hernia repaired. Referring pt to Gen surgery at this time for further evaluation. Will monitor + manage according to recs.  - Referral to General Surgery    2. Hypertension, unspecified type  Pt requested refill of prinzide at this visit. His blood pressure was elevated today on two readings. Pt reports taking blood pressure medication around bed time. He denies associated symptoms of HTN. Pt instructed to take prinzide in the morning, and to record blood pressures at home for 1 week daily. Pt to schedule appointment in the setting of worsening blood pressures for adjustment of medication for management of HTN. Pt agreed with the plan; unsure of availability, will schedule meeting accordingly. Will f/u.  - lisinopril-hydrochlorothiazide (PRINZIDE) 20-25 MG per tablet; Take 1 Tablet by mouth every day.  Dispense: 30 Tablet; Refill: 3    3. Insomnia, unspecified type  Pt requested refill of trazodone used for insomnia.  - traZODone (DESYREL) 100 MG Tab; Take 1 Tablet by mouth every evening.  Dispense: 30 Tablet; Refill: 2    4. Screening for HIV (human immunodeficiency virus)  Pt elected to undergo HIV screening at this visit.  - HIV AG/AB COMBO ASSAY SCREENING; Future    5. Screening for metabolic disorder  Pt to be evaluated for metabolic disorder. Last lab work completed >15 years ago. Pt previously had scheduled lab work with PCP, however has been unable to complete it. Reordering at this time to evaluate. Will f/u.  - CBC WITH DIFFERENTIAL; Future  - Comp Metabolic Panel; Future  - TSH WITH REFLEX TO FT4; Future  - Lipid Profile;  Future  - HEMOGLOBIN A1C; Future  - VITAMIN D,25 HYDROXY (DEFICIENCY); Future  - MICROALBUMIN CREAT RATIO URINE; Future    6. Crohn's disease with complication, unspecified gastrointestinal tract location (HCC)  Pt has a hx Crohn disease, s/p surgical intervention; procedure not found in patient's chart. Would like pt to establish care with GI for monitoring for malignancy, worsening of Crohn's. Would also like patient to undergo colonoscopy. Will f/u.  - Referral to Gastroenterology       Orders Placed This Encounter    HIV AG/AB COMBO ASSAY SCREENING    CBC WITH DIFFERENTIAL    Comp Metabolic Panel    TSH WITH REFLEX TO FT4    Lipid Profile    HEMOGLOBIN A1C    VITAMIN D,25 HYDROXY (DEFICIENCY)    MICROALBUMIN CREAT RATIO URINE    Referral to Gastroenterology    Referral to General Surgery    lisinopril-hydrochlorothiazide (PRINZIDE) 20-25 MG per tablet    traZODone (DESYREL) 100 MG Tab       No follow-ups on file.    Pita Anaya M.D. PGY I  Internal Medicine  [facility]

## 2024-04-12 RX ORDER — PRAZOSIN HYDROCHLORIDE 1 MG/1
1 CAPSULE ORAL EVERY EVENING
Qty: 30 CAPSULE | Refills: 0 | Status: SHIPPED | OUTPATIENT
Start: 2024-04-12 | End: 2024-04-23

## 2024-04-12 NOTE — TELEPHONE ENCOUNTER
Received request via: Pharmacy    Was the patient seen in the last year in this department? Yes    Does the patient have an active prescription (recently filled or refills available) for medication(s) requested?  yes    Pharmacy Name: [facility]    Does the patient have half-way Plus and need 100 day supply (blood pressure, diabetes and cholesterol meds only)? Patient does not have SCP

## 2024-04-23 ENCOUNTER — OFFICE VISIT (OUTPATIENT)
Dept: INTERNAL MEDICINE | Facility: OTHER | Age: 49
End: 2024-04-23
Payer: COMMERCIAL

## 2024-04-23 VITALS
TEMPERATURE: 98 F | DIASTOLIC BLOOD PRESSURE: 93 MMHG | HEIGHT: 76 IN | BODY MASS INDEX: 29.71 KG/M2 | OXYGEN SATURATION: 92 % | WEIGHT: 244 LBS | HEART RATE: 92 BPM | SYSTOLIC BLOOD PRESSURE: 149 MMHG

## 2024-04-23 DIAGNOSIS — K46.9 ABDOMINAL HERNIA WITHOUT OBSTRUCTION AND WITHOUT GANGRENE, RECURRENCE NOT SPECIFIED, UNSPECIFIED HERNIA TYPE: ICD-10-CM

## 2024-04-23 DIAGNOSIS — I10 ESSENTIAL HYPERTENSION: ICD-10-CM

## 2024-04-23 RX ORDER — PRAZOSIN HYDROCHLORIDE 2 MG/1
2 CAPSULE ORAL NIGHTLY
Qty: 30 CAPSULE | Refills: 3 | Status: SHIPPED | OUTPATIENT
Start: 2024-04-23

## 2024-04-23 ASSESSMENT — ENCOUNTER SYMPTOMS
PALPITATIONS: 0
SPUTUM PRODUCTION: 0
WEIGHT LOSS: 0
VOMITING: 0
COUGH: 0
HEMOPTYSIS: 0
FEVER: 0
DIARRHEA: 0
ORTHOPNEA: 0
EYES NEGATIVE: 1
MUSCULOSKELETAL NEGATIVE: 1
HEARTBURN: 0
SHORTNESS OF BREATH: 0
CHILLS: 0
NAUSEA: 0
ABDOMINAL PAIN: 0

## 2024-04-23 NOTE — ASSESSMENT & PLAN NOTE
-Reducible abdominal hernia for ~2 months  -Patient involved in heavy lifting of [employer] for work  -No pain, fevers, chills, nausea, vomiting  -Provided with contact information for General Surgery referral today  -Provided prescription for abdominal binder  -Advised against any heavy lifting

## 2024-04-23 NOTE — ASSESSMENT & PLAN NOTE
-Blood Pressure 149/93; repeat with more appropriately sized cuff and at rest 154/95  -Adherent to his Lisinopril-HCTZ 20-25mg daily, and took it just about an hour ago  -At this time, uptitrating Prazosin to 2mg daily  -Advising patient to please keep BP log at home for review at subsequent visit  -Encouraged patient to also get outpatient labs done for review

## 2024-04-23 NOTE — PROGRESS NOTES
Established Patient    Chief Complaint   Patient presents with    Follow-Up       HISTORY OF PRESENT ILLNESS:     Mr. Mason Craig is our 49 year old male patient with a past medical history that is significant for crohn's disease, hypertension, generalized anxiety disorder, major depressive disorder who presents to our outpatient clinic today for routine follow up.     At prior visit, patient with complaint of epigastric abdominal protrusion; has had this for about 2 months. Denies fevers, chills, nausea, vomiting, pain, diarrhea. Some periods of constipation. Most recent bowel movement was normal and just this morning. On physical exam, patient with high suspicion for reducible hernia. 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 Has not yet established with general surgery for further evaluation and treatment. Provided with contact information and encouraged to follow up at this time.    Patient has not yet done his outpatient labs: including CBC, CMP, TSH, Lipid Profile, HgbA1c, Vit 1, Microalbumin-Cr ratio. Encouraged to follow up.    Has pending referral to GI for colonoscopy in light of history of Crohns. No recent flares, and has opted to not pursue medical treatment.    Blood Pressure 149/93; repeat with more appropriately sized cuff and at rest 154/95. Adherent to his Lisinopril-HCTZ 20-25mg daily, and took it just about an hour ago. Advising patient to please keep BP log at home for review. No slurred speech, facial droop, or pronator drift on exam; denies any chest pain, lightheadedness, or dizziness.     Past Medical History:   Diagnosis Date    ADHD     Crohn's disease (HCC)     Hypertension     krones        Patient Active Problem List    Diagnosis Date Noted    Abdominal hernia 04/23/2024    Hypomania (HCC) 08/18/2023    Erectile dysfunction 08/15/2023    Insomnia 12/05/2022    SHEY (generalized anxiety disorder) 08/20/2021    Crohn's disease (HCC) 11/04/2020    Essential  "hypertension 11/04/2020       Allergies:Patient has no known allergies.    Current Outpatient Medications   Medication Sig Dispense Refill    prazosin (MINIPRESS) 2 MG Cap Take 1 Capsule by mouth every evening. 30 Capsule 3    lisinopril-hydrochlorothiazide (PRINZIDE) 20-25 MG per tablet Take 1 Tablet by mouth every day. 30 Tablet 3    traZODone (DESYREL) 100 MG Tab Take 1 Tablet by mouth every evening. 30 Tablet 2    busPIRone (BUSPAR) 10 MG Tab tablet TAKE 1/2 (ONE-HALF) TABLET BY MOUTH TWICE DAILY FOR 7 DAYS THEN TAKE 1 TABLET TWICE DAILY 90 Tablet 3    cyclobenzaprine (FLEXERIL) 5 mg tablet Take 1 Tablet by mouth 2 times a day as needed for Muscle Spasms or Moderate Pain. 30 Tablet 0     No current facility-administered medications for this visit.       Social History     Tobacco Use    Smoking status: Never    Smokeless tobacco: Never   Vaping Use    Vaping Use: Never used   Substance Use Topics    Alcohol use: Yes     Comment: occ     Drug use: No       Family History   Problem Relation Age of Onset    No Known Problems Mother     No Known Problems Father     Cancer Neg Hx     Diabetes Neg Hx     Hypertension Neg Hx     Hyperlipidemia Neg Hx     Stroke Neg Hx     Heart Disease Neg Hx          Review of Systems   Constitutional:  Negative for chills, fever, malaise/fatigue and weight loss.   HENT: Negative.     Eyes: Negative.    Respiratory:  Negative for cough, hemoptysis, sputum production and shortness of breath.    Cardiovascular:  Negative for chest pain, palpitations and orthopnea.   Gastrointestinal:  Negative for abdominal pain, diarrhea, heartburn, nausea and vomiting.   Genitourinary:  Negative for dysuria, frequency and urgency.   Musculoskeletal: Negative.    Skin: Negative.        Exam:  BP (!) 149/93 (BP Location: Left arm, Patient Position: Sitting, BP Cuff Size: Adult)   Pulse 92   Temp 36.7 °C (98 °F) (Temporal)   Ht 1.93 m (6' 4\")   Wt 111 kg (244 lb)   SpO2 92%  Body mass index is 29.7 " kg/m².    Constitutional:  Not in acute distress, well appearing.  HEENT:   Normocephalic, atraumatic.  Cardiovascular: S1, S2; Regular rate and rhythm; No murmurs/rubs/gallops.  Lungs:   Clear to auscultation bilaterally; No wheezes/rhales/rhonchi; No respiratory distress.  Abdomen: Reducible epigastric abdominal hernia  Extremities:  No cyanosis/clubbing/edema; No obvious deformities.  Skin:  Warm and dry.  No visible rashes.  Neurologic: Alert Awake & Oriented x 3, CN II-XII grossly intact; strength and sensation grossly intact.  No focal deficits noted.  Psychiatric:  Affect normal, mood normal, judgment normal.    Assessment/Plan:   Mr. Mason Craig is our 49 year old male patient with a past medical history that is significant for crohn's disease, hypertension, generalized anxiety disorder, major depressive disorder who presents to our outpatient clinic today for routine follow up.     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  Followup: No follow-ups on file.    Javier Bethea MD  PGY-3 Internal Medicine Resident

## 2024-04-23 NOTE — PATIENT INSTRUCTIONS
-Follow up with Surgery referral:  [facility]  [id number]  [place] [address]  Phone: [phone]  -Please follow up with your labs  -Please take your new higher dose Prazosin  -Please monitor your home blood pressures for review  -Follow up in june

## 2024-06-04 ENCOUNTER — OFFICE VISIT (OUTPATIENT)
Dept: INTERNAL MEDICINE | Facility: OTHER | Age: 49
End: 2024-06-04
Payer: COMMERCIAL

## 2024-06-04 VITALS
OXYGEN SATURATION: 99 % | DIASTOLIC BLOOD PRESSURE: 95 MMHG | HEART RATE: 89 BPM | TEMPERATURE: 97.8 F | BODY MASS INDEX: 30.71 KG/M2 | WEIGHT: 252.2 LBS | HEIGHT: 76 IN | SYSTOLIC BLOOD PRESSURE: 175 MMHG

## 2024-06-04 DIAGNOSIS — K46.9 ABDOMINAL HERNIA WITHOUT OBSTRUCTION AND WITHOUT GANGRENE, RECURRENCE NOT SPECIFIED, UNSPECIFIED HERNIA TYPE: ICD-10-CM

## 2024-06-04 DIAGNOSIS — N52.9 ERECTILE DYSFUNCTION, UNSPECIFIED ERECTILE DYSFUNCTION TYPE: ICD-10-CM

## 2024-06-04 DIAGNOSIS — Z00.00 PREVENTATIVE HEALTH CARE: ICD-10-CM

## 2024-06-04 DIAGNOSIS — I10 ESSENTIAL HYPERTENSION: ICD-10-CM

## 2024-06-04 PROCEDURE — 3077F SYST BP >= 140 MM HG: CPT | Mod: GE | Performed by: STUDENT IN AN ORGANIZED HEALTH CARE EDUCATION/TRAINING PROGRAM

## 2024-06-04 PROCEDURE — 3080F DIAST BP >= 90 MM HG: CPT | Mod: GE | Performed by: STUDENT IN AN ORGANIZED HEALTH CARE EDUCATION/TRAINING PROGRAM

## 2024-06-04 PROCEDURE — 99213 OFFICE O/P EST LOW 20 MIN: CPT | Mod: GE | Performed by: STUDENT IN AN ORGANIZED HEALTH CARE EDUCATION/TRAINING PROGRAM

## 2024-06-04 RX ORDER — AMLODIPINE BESYLATE 5 MG/1
5 TABLET ORAL DAILY
Qty: 30 TABLET | Refills: 1 | Status: SHIPPED | OUTPATIENT
Start: 2024-06-04

## 2024-06-04 ASSESSMENT — ENCOUNTER SYMPTOMS
DIZZINESS: 0
HEARTBURN: 0
VOMITING: 0
HEMOPTYSIS: 0
BLURRED VISION: 0
PALPITATIONS: 0
ABDOMINAL PAIN: 0
NAUSEA: 0
DOUBLE VISION: 0
FEVER: 0
CHILLS: 0
MYALGIAS: 0
COUGH: 0
HEADACHES: 0

## 2024-06-04 NOTE — PROGRESS NOTES
Established Patient    Chief Complaint   Patient presents with    Follow-Up       HISTORY OF PRESENT ILLNESS:     Mr. Mason Craig is our 49 year old male patient with a past medical history that is significant for crohn's disease, hypertension, generalized anxiety disorder, major depressive disorder who presents to our outpatient clinic today for routine follow up.    Patient still has not yet done his outpatient labs: including CBC, CMP, TSH, Lipid Profile, HgbA1c, Microalbumin-Cr ratio. Encouraged to follow up.    Has pending referral to GI for colonoscopy in light of history of Crohns. No recent flares, and has opted to not pursue medical treatment.     Provided surgery referral previously for reducible abdominal hernia. Patient does a lot of heavy lifting for work with [email or web address]. No signs of incarceration. Previously provided prescription for abdominal binder.    Blood Pressure 175/95. Repeat 153/96. Patient endorses that he is dealing with several stressors today including his truck breaking down while trying to get to clinic. On Lisinopril-HCTZ 20-25mg daily. Previously advised patient to please keep BP log at home for review. Endorses home blood pressures typically 120s-130s over 80s-90s at home when relaxed. No slurred speech, facial droop, or pronator drift on exam; denies any chest pain, lightheadedness, or dizziness. At this time adding Amlodipine 5mg daily.    Friend gave him an injection of testosterone and is inquiring if that is something he can be considered for prescription; also asking about semaglutide for weight loss.    Past Medical History:   Diagnosis Date    ADHD     Crohn's disease (HCC)     Hypertension     krones        Patient Active Problem List    Diagnosis Date Noted    Preventative health care 06/04/2024    Abdominal hernia 04/23/2024    Hypomania (HCC) 08/18/2023    Erectile dysfunction 08/15/2023    Insomnia 12/05/2022    SHEY (generalized anxiety disorder) 08/20/2021     Crohn's disease (HCC) 11/04/2020    Essential hypertension 11/04/2020       Allergies:Patient has no known allergies.    Current Outpatient Medications   Medication Sig Dispense Refill    amLODIPine (NORVASC) 5 MG Tab Take 1 Tablet by mouth every day. 30 Tablet 1    prazosin (MINIPRESS) 2 MG Cap Take 1 Capsule by mouth every evening. 30 Capsule 3    lisinopril-hydrochlorothiazide (PRINZIDE) 20-25 MG per tablet Take 1 Tablet by mouth every day. 30 Tablet 3    traZODone (DESYREL) 100 MG Tab Take 1 Tablet by mouth every evening. 30 Tablet 2    busPIRone (BUSPAR) 10 MG Tab tablet TAKE 1/2 (ONE-HALF) TABLET BY MOUTH TWICE DAILY FOR 7 DAYS THEN TAKE 1 TABLET TWICE DAILY 90 Tablet 3    cyclobenzaprine (FLEXERIL) 5 mg tablet Take 1 Tablet by mouth 2 times a day as needed for Muscle Spasms or Moderate Pain. 30 Tablet 0     No current facility-administered medications for this visit.       Social History     Tobacco Use    Smoking status: Never    Smokeless tobacco: Never   Vaping Use    Vaping status: Never Used   Substance Use Topics    Alcohol use: Yes     Comment: occ     Drug use: No       Family History   Problem Relation Age of Onset    No Known Problems Mother     No Known Problems Father     Cancer Neg Hx     Diabetes Neg Hx     Hypertension Neg Hx     Hyperlipidemia Neg Hx     Stroke Neg Hx     Heart Disease Neg Hx          Review of Systems   Constitutional:  Negative for chills and fever.   HENT:  Negative for hearing loss.    Eyes:  Negative for blurred vision and double vision.   Respiratory:  Negative for cough and hemoptysis.    Cardiovascular:  Negative for chest pain and palpitations.   Gastrointestinal:  Negative for abdominal pain, heartburn, nausea and vomiting.   Genitourinary:  Negative for dysuria and urgency.   Musculoskeletal:  Negative for myalgias.   Skin:  Negative for rash.   Neurological:  Negative for dizziness and headaches.       Exam:  BP (!) 175/95 (BP Location: Left arm, Patient  "Position: Sitting, BP Cuff Size: Large adult)   Pulse 89   Temp 36.6 °C (97.8 °F) (Temporal)   Ht 1.93 m (6' 4\")   Wt 114 kg (252 lb 3.2 oz)   SpO2 99%  Body mass index is 30.7 kg/m².    Constitutional:  Not in acute distress, well appearing.  HEENT:   Normocephalic, atraumatic.  Cardiovascular: S1, S2; Regular rate and rhythm; No murmurs/rubs/gallops.  Lungs:   Clear to auscultation bilaterally; No wheezes/rhales/rhonchi; No respiratory distress.  Abdomen: Soft, nondistended, not tender to palpation; no guarding no rigidity; no masses.  Extremities:  No cyanosis/clubbing/edema; No obvious deformities.  Skin:  Warm and dry.  No visible rashes.  Neurologic: Alert Awake & Oriented x 3, CN II-XII grossly intact; strength and sensation grossly intact.  No focal deficits noted.  Psychiatric:  Affect normal, mood normal, judgment normal.    Assessment/Plan:   Mr. Mason Craig is our 49 year old male patient with a past medical history that is significant for crohn's disease, hypertension, generalized anxiety disorder, major depressive disorder who presents to our outpatient clinic today for routine follow up.    Essential hypertension  -Blood Pressure 175/95. Repeat 153/96  -Patient endorses that he is dealing with several stressors today including his truck breaking down while trying to get to clinic  -On Lisinopril-HCTZ 20-25mg daily; endorses daily adherence with no skipped doses  -Advised patient again to please keep BP log at home for review  -Endorses home blood pressures typically 120s-140s over 80s-90s at home when relaxed  -No slurred speech, facial droop, or pronator drift on exam; denies any chest pain, lightheadedness, or dizziness  -At this time adding Amlodipine 5mg daily  -Follow up in 1 month    Abdominal hernia  -Provided surgery referral previously for reducible abdominal hernia  -Patient does a lot of heavy lifting for work with [email or web address]  -No signs of " incarceration/strangulation  -Previously provided prescription for abdominal binder  -Encouraged follow up    Preventative health care  -Encouraged patient to follow up with his outpatient labs: including CBC, CMP, TSH, Lipid Profile, HgbA1c, Microalbumin-Cr ratio    Erectile dysfunction  -History of ED  -Included testosterone with outpatient labs     All imaging results and lab results and consult notes are reviewed at this visit.  Followup: No follow-ups on file.    Javier Bethea MD  PGY-3 Internal Medicine Resident

## 2024-06-04 NOTE — ASSESSMENT & PLAN NOTE
-Encouraged patient to follow up with his outpatient labs: including CBC, CMP, TSH, Lipid Profile, HgbA1c, Microalbumin-Cr ratio

## 2024-06-04 NOTE — ASSESSMENT & PLAN NOTE
-Provided surgery referral previously for reducible abdominal hernia  -Patient does a lot of heavy lifting for work with [employer]  -No signs of incarceration/strangulation  -Previously provided prescription for abdominal binder  -Encouraged follow up

## 2024-06-04 NOTE — ASSESSMENT & PLAN NOTE
-Blood Pressure 175/95. Repeat 153/96  -Patient endorses that he is dealing with several stressors today including his truck breaking down while trying to get to clinic  -On Lisinopril-HCTZ 20-25mg daily; endorses daily adherence with no skipped doses  -Advised patient again to please keep BP log at home for review  -Endorses home blood pressures typically 120s-140s over 80s-90s at home when relaxed  -No slurred speech, facial droop, or pronator drift on exam; denies any chest pain, lightheadedness, or dizziness  -At this time adding Amlodipine 5mg daily  -Follow up in 1 month

## 2024-07-23 DIAGNOSIS — G47.00 INSOMNIA, UNSPECIFIED TYPE: ICD-10-CM

## 2024-07-23 RX ORDER — TRAZODONE HYDROCHLORIDE 100 MG/1
100 TABLET ORAL EVERY EVENING
Qty: 30 TABLET | Refills: 0 | Status: SHIPPED | OUTPATIENT
Start: 2024-07-23 | End: 2024-07-24 | Stop reason: SDUPTHER

## 2024-07-24 ENCOUNTER — OFFICE VISIT (OUTPATIENT)
Dept: INTERNAL MEDICINE | Facility: OTHER | Age: 49
End: 2024-07-24
Payer: COMMERCIAL

## 2024-07-24 VITALS
HEIGHT: 76 IN | TEMPERATURE: 97.6 F | WEIGHT: 236.2 LBS | OXYGEN SATURATION: 92 % | DIASTOLIC BLOOD PRESSURE: 78 MMHG | BODY MASS INDEX: 28.76 KG/M2 | HEART RATE: 105 BPM | SYSTOLIC BLOOD PRESSURE: 122 MMHG

## 2024-07-24 DIAGNOSIS — I10 HYPERTENSION, UNSPECIFIED TYPE: ICD-10-CM

## 2024-07-24 DIAGNOSIS — E13.69 OTHER SPECIFIED DIABETES MELLITUS WITH OTHER SPECIFIED COMPLICATION, UNSPECIFIED WHETHER LONG TERM INSULIN USE (HCC): ICD-10-CM

## 2024-07-24 DIAGNOSIS — G47.00 INSOMNIA, UNSPECIFIED TYPE: ICD-10-CM

## 2024-07-24 DIAGNOSIS — R35.89 POLYURIA: ICD-10-CM

## 2024-07-24 LAB
HBA1C MFR BLD: 10.1 % (ref ?–5.8)
POCT INT CON NEG: NEGATIVE
POCT INT CON POS: POSITIVE

## 2024-07-24 PROCEDURE — 3078F DIAST BP <80 MM HG: CPT | Mod: GC

## 2024-07-24 PROCEDURE — 3074F SYST BP LT 130 MM HG: CPT | Mod: GC

## 2024-07-24 PROCEDURE — 83036 HEMOGLOBIN GLYCOSYLATED A1C: CPT | Mod: QW,GC

## 2024-07-24 PROCEDURE — 99214 OFFICE O/P EST MOD 30 MIN: CPT | Mod: GC

## 2024-07-24 RX ORDER — TRAZODONE HYDROCHLORIDE 100 MG/1
100 TABLET ORAL EVERY EVENING
Qty: 30 TABLET | Refills: 0 | Status: SHIPPED | OUTPATIENT
Start: 2024-07-24

## 2024-07-24 RX ORDER — CYCLOBENZAPRINE HCL 5 MG
5 TABLET ORAL 2 TIMES DAILY PRN
Qty: 30 TABLET | Refills: 0 | Status: SHIPPED | OUTPATIENT
Start: 2024-07-24

## 2024-07-24 RX ORDER — PRAZOSIN HYDROCHLORIDE 2 MG/1
2 CAPSULE ORAL NIGHTLY
Qty: 30 CAPSULE | Refills: 3 | Status: CANCELLED | OUTPATIENT
Start: 2024-07-24

## 2024-07-24 RX ORDER — TRAZODONE HYDROCHLORIDE 100 MG/1
100 TABLET ORAL EVERY EVENING
Qty: 30 TABLET | Refills: 0 | Status: CANCELLED | OUTPATIENT
Start: 2024-07-24

## 2024-07-24 RX ORDER — PRAZOSIN HYDROCHLORIDE 2 MG/1
2 CAPSULE ORAL NIGHTLY
Qty: 30 CAPSULE | Refills: 3 | Status: SHIPPED | OUTPATIENT
Start: 2024-07-24

## 2024-07-24 RX ORDER — GLUCOSAMINE HCL/CHONDROITIN SU 500-400 MG
CAPSULE ORAL
Qty: 100 EACH | Refills: 0 | Status: SHIPPED | OUTPATIENT
Start: 2024-07-24

## 2024-07-24 RX ORDER — LANCETS 30 GAUGE
EACH MISCELLANEOUS
Qty: 100 EACH | Refills: 0 | Status: SHIPPED | OUTPATIENT
Start: 2024-07-24

## 2024-07-24 RX ORDER — METFORMIN HYDROCHLORIDE 500 MG/1
TABLET, EXTENDED RELEASE ORAL
Qty: 70 TABLET | Refills: 0 | Status: SHIPPED | OUTPATIENT
Start: 2024-07-24 | End: 2024-08-21

## 2024-07-24 RX ORDER — LISINOPRIL AND HYDROCHLOROTHIAZIDE 25; 20 MG/1; MG/1
1 TABLET ORAL DAILY
Qty: 30 TABLET | Refills: 3 | Status: CANCELLED | OUTPATIENT
Start: 2024-07-24

## 2024-07-24 RX ORDER — INSULIN GLARGINE 100 [IU]/ML
10 INJECTION, SOLUTION SUBCUTANEOUS EVERY EVENING
Qty: 10 ML | Refills: 0 | Status: SHIPPED | OUTPATIENT
Start: 2024-07-24

## 2024-07-24 ASSESSMENT — ENCOUNTER SYMPTOMS
SHORTNESS OF BREATH: 0
MYALGIAS: 0
PALPITATIONS: 0
NECK PAIN: 0
STRIDOR: 0
BLURRED VISION: 1
HEADACHES: 0
COUGH: 0
FEVER: 0
DOUBLE VISION: 0
SORE THROAT: 0
CHILLS: 0
WHEEZING: 0
SPUTUM PRODUCTION: 0
VOMITING: 0
DEPRESSION: 0
ABDOMINAL PAIN: 0
DIZZINESS: 0
DIARRHEA: 0

## 2024-07-24 ASSESSMENT — PAIN SCALES - GENERAL: PAINLEVEL: NO PAIN

## 2024-07-26 ENCOUNTER — PATIENT MESSAGE (OUTPATIENT)
Dept: INTERNAL MEDICINE | Facility: OTHER | Age: 49
End: 2024-07-26
Payer: COMMERCIAL

## 2024-07-26 DIAGNOSIS — F41.1 GAD (GENERALIZED ANXIETY DISORDER): ICD-10-CM

## 2024-07-26 RX ORDER — BUSPIRONE HYDROCHLORIDE 10 MG/1
10 TABLET ORAL DAILY
Qty: 90 TABLET | Refills: 0 | Status: SHIPPED | OUTPATIENT
Start: 2024-07-26 | End: 2024-10-24

## 2024-07-29 ENCOUNTER — TELEPHONE (OUTPATIENT)
Dept: INTERNAL MEDICINE | Facility: OTHER | Age: 49
End: 2024-07-29
Payer: COMMERCIAL

## 2024-08-07 ENCOUNTER — HOSPITAL ENCOUNTER (OUTPATIENT)
Dept: LAB | Facility: MEDICAL CENTER | Age: 49
End: 2024-08-07
Payer: COMMERCIAL

## 2024-08-07 DIAGNOSIS — I10 HYPERTENSION, UNSPECIFIED TYPE: ICD-10-CM

## 2024-08-07 DIAGNOSIS — Z13.228 SCREENING FOR METABOLIC DISORDER: ICD-10-CM

## 2024-08-07 DIAGNOSIS — N52.9 ERECTILE DYSFUNCTION, UNSPECIFIED ERECTILE DYSFUNCTION TYPE: ICD-10-CM

## 2024-08-07 LAB
ANION GAP SERPL CALC-SCNC: 13 MMOL/L (ref 7–16)
BUN SERPL-MCNC: 24 MG/DL (ref 8–22)
CALCIUM SERPL-MCNC: 10 MG/DL (ref 8.4–10.2)
CHLORIDE SERPL-SCNC: 94 MMOL/L (ref 96–112)
CHOLEST SERPL-MCNC: 198 MG/DL (ref 100–199)
CO2 SERPL-SCNC: 26 MMOL/L (ref 20–33)
CREAT SERPL-MCNC: 0.8 MG/DL (ref 0.5–1.4)
GFR SERPLBLD CREATININE-BSD FMLA CKD-EPI: 108 ML/MIN/1.73 M 2
GLUCOSE SERPL-MCNC: 363 MG/DL (ref 65–99)
HDLC SERPL-MCNC: 44 MG/DL
LDLC SERPL CALC-MCNC: 76 MG/DL
POTASSIUM SERPL-SCNC: 4.6 MMOL/L (ref 3.6–5.5)
SODIUM SERPL-SCNC: 133 MMOL/L (ref 135–145)
TRIGL SERPL-MCNC: 389 MG/DL (ref 0–149)

## 2024-08-07 PROCEDURE — 84403 ASSAY OF TOTAL TESTOSTERONE: CPT

## 2024-08-07 PROCEDURE — 84681 ASSAY OF C-PEPTIDE: CPT

## 2024-08-07 PROCEDURE — 86341 ISLET CELL ANTIBODY: CPT | Mod: 59

## 2024-08-07 PROCEDURE — 80061 LIPID PANEL: CPT

## 2024-08-07 PROCEDURE — 83036 HEMOGLOBIN GLYCOSYLATED A1C: CPT

## 2024-08-07 PROCEDURE — 80048 BASIC METABOLIC PNL TOTAL CA: CPT

## 2024-08-07 PROCEDURE — 36415 COLL VENOUS BLD VENIPUNCTURE: CPT

## 2024-08-07 PROCEDURE — 83525 ASSAY OF INSULIN: CPT

## 2024-08-08 LAB
EST. AVERAGE GLUCOSE BLD GHB EST-MCNC: 258 MG/DL
HBA1C MFR BLD: 10.6 % (ref 4–5.6)
TESTOST SERPL-MCNC: 104 NG/DL (ref 175–781)

## 2024-08-09 ENCOUNTER — OFFICE VISIT (OUTPATIENT)
Dept: INTERNAL MEDICINE | Facility: OTHER | Age: 49
End: 2024-08-09
Payer: COMMERCIAL

## 2024-08-09 VITALS
OXYGEN SATURATION: 92 % | BODY MASS INDEX: 28.62 KG/M2 | DIASTOLIC BLOOD PRESSURE: 80 MMHG | WEIGHT: 235 LBS | TEMPERATURE: 96.8 F | HEIGHT: 76 IN | SYSTOLIC BLOOD PRESSURE: 116 MMHG | HEART RATE: 85 BPM

## 2024-08-09 DIAGNOSIS — E13.69 OTHER SPECIFIED DIABETES MELLITUS WITH OTHER SPECIFIED COMPLICATION, UNSPECIFIED WHETHER LONG TERM INSULIN USE (HCC): ICD-10-CM

## 2024-08-09 DIAGNOSIS — E13.9 OTHER SPECIFIED DIABETES MELLITUS WITHOUT COMPLICATION, WITHOUT LONG-TERM CURRENT USE OF INSULIN (HCC): ICD-10-CM

## 2024-08-09 DIAGNOSIS — I10 ESSENTIAL HYPERTENSION: ICD-10-CM

## 2024-08-09 PROBLEM — E11.9 DIABETES (HCC): Status: ACTIVE | Noted: 2024-08-09

## 2024-08-09 LAB
C PEPTIDE SERPL-MCNC: 4.9 NG/ML (ref 0.5–3.3)
INSULIN P FAST SERPL-ACNC: 45 UIU/ML (ref 3–25)

## 2024-08-09 PROCEDURE — 3079F DIAST BP 80-89 MM HG: CPT | Mod: GC

## 2024-08-09 PROCEDURE — 99214 OFFICE O/P EST MOD 30 MIN: CPT | Mod: GC

## 2024-08-09 PROCEDURE — 3074F SYST BP LT 130 MM HG: CPT | Mod: GC

## 2024-08-09 RX ORDER — INSULIN GLARGINE 100 [IU]/ML
20 INJECTION, SOLUTION SUBCUTANEOUS EVERY EVENING
Qty: 10 ML | Refills: 0 | Status: SHIPPED | OUTPATIENT
Start: 2024-08-09 | End: 2024-08-30 | Stop reason: SDUPTHER

## 2024-08-09 ASSESSMENT — PAIN SCALES - GENERAL: PAINLEVEL: NO PAIN

## 2024-08-09 ASSESSMENT — ENCOUNTER SYMPTOMS
WEAKNESS: 0
FEVER: 0
DIARRHEA: 0
PALPITATIONS: 0
SHORTNESS OF BREATH: 0
WEIGHT LOSS: 1
VOMITING: 0
MYALGIAS: 0
EYE REDNESS: 0
FALLS: 0
ABDOMINAL PAIN: 0
CHILLS: 0
WHEEZING: 0
CONSTIPATION: 0

## 2024-08-09 NOTE — PROGRESS NOTES
ESTABLISHED PATIENT VISIT    PATIENT ID:  Name: Mason Craig  YOB: 1975  Patient Care Team:  Gerardo Rodriguze D.O. as PCP - General (Internal Medicine)    CHIEF COMPLAINT(s):  Diabetes, Hypertension, Lab Results, and Follow-Up    Follow up for Diagnoses of Other specified diabetes mellitus without complication, without long-term current use of insulin (HCC), Essential hypertension, and Other specified diabetes mellitus with other specified complication, unspecified whether long term insulin use (HCC) were pertinent to this visit.    History of Present Illness:    This is a pleasant 49 y.o. male clinic patient established with my colleague Dr. Rodriguez who presents today for follow up regarding diabetes.      Review of Systems   Constitutional:  Positive for malaise/fatigue and weight loss. Negative for chills and fever.   HENT:  Negative for congestion.    Eyes:  Negative for redness.   Respiratory:  Negative for shortness of breath and wheezing.    Cardiovascular:  Negative for chest pain and palpitations.   Gastrointestinal:  Negative for abdominal pain, constipation, diarrhea and vomiting.   Musculoskeletal:  Negative for falls and myalgias.   Neurological:  Negative for weakness.       Past Medical History:   Diagnosis Date    ADHD     Crohn's disease (HCC)     Hypertension     krones      Past Surgical History:   Procedure Laterality Date    APPENDECTOMY      COLON RESECTION      OTHER ABDOMINAL SURGERY      OTHER ORTHOPEDIC SURGERY      RESECTION, PARTIAL SMALL BOWEL       Family History   Problem Relation Age of Onset    No Known Problems Mother     No Known Problems Father     Cancer Neg Hx     Diabetes Neg Hx     Hypertension Neg Hx     Hyperlipidemia Neg Hx     Stroke Neg Hx     Heart Disease Neg Hx      Patient has no known allergies.  Social History     Tobacco Use    Smoking status: Never    Smokeless tobacco: Never   Vaping Use    Vaping status: Never Used   Substance Use Topics     "Alcohol use: Yes     Comment: occ     Drug use: No     Current Outpatient Medications   Medication Sig Dispense Refill    insulin glargine 100 UNIT/ML SC SOLN Inject 20 Units under the skin every evening. 10 mL 0    busPIRone (BUSPAR) 10 MG Tab tablet Take 1 Tablet by mouth every day for 90 days. TAKE 1/2 (ONE-HALF) TABLET BY MOUTH TWICE DAILY FOR 7 DAYS THEN TAKE 1 TABLET TWICE DAILY 90 Tablet 0    Blood Glucose Meter Kit Test blood sugar as recommended by provider. As covered by insurance blood glucose monitoring kit. 1 Kit 0    Blood Glucose Test Strips Use one as covered by insurance strip to test blood sugar three times daily. 100 Strip 0    Lancets Use one as covered by insurance lancet to test blood sugar three times daily before meals. 100 Each 0    Alcohol Swabs Wipe site with prep pad prior to injection. 100 Each 0    Insulin Pen Needle 32 G x 4 mm Use one pen needle in pen device to inject insulin once daily. 100 Each 0    metFORMIN ER (GLUCOPHAGE XR) 500 MG TABLET SR 24 HR Take 1 Tablet by mouth every day for 7 days, THEN 2 Tablets every day for 7 days, THEN 3 Tablets every day for 7 days, THEN 4 Tablets every day for 7 days. 70 Tablet 0    traZODone (DESYREL) 100 MG Tab Take 1 Tablet by mouth every evening. 30 Tablet 0    prazosin (MINIPRESS) 2 MG Cap Take 1 Capsule by mouth every evening. 30 Capsule 3    cyclobenzaprine (FLEXERIL) 5 mg tablet Take 1 Tablet by mouth 2 times a day as needed for Muscle Spasms or Moderate Pain. 30 Tablet 0    lisinopril-hydrochlorothiazide (PRINZIDE) 20-25 MG per tablet Take 1 Tablet by mouth every day. 30 Tablet 3     No current facility-administered medications for this visit.       OBJECTIVE:  /80 (BP Location: Left arm, Patient Position: Sitting, BP Cuff Size: Large adult)   Pulse 85   Temp 36 °C (96.8 °F) (Temporal)   Ht 1.93 m (6' 4\")   Wt 107 kg (235 lb)   SpO2 92%   BMI 28.61 kg/m²   Physical Exam  Vitals reviewed.   Constitutional:       General: He is " not in acute distress.     Appearance: Normal appearance. He is normal weight. He is not ill-appearing or toxic-appearing.   HENT:      Head: Normocephalic.      Nose: Nose normal.      Mouth/Throat:      Pharynx: Oropharynx is clear.   Eyes:      Conjunctiva/sclera: Conjunctivae normal.   Cardiovascular:      Rate and Rhythm: Normal rate.      Heart sounds: Normal heart sounds. No murmur heard.  Pulmonary:      Effort: Pulmonary effort is normal. No respiratory distress.      Breath sounds: No wheezing.   Abdominal:      Tenderness: There is no abdominal tenderness.   Musculoskeletal:         General: Normal range of motion.      Cervical back: Normal range of motion.      Right lower leg: No edema.      Left lower leg: No edema.      Comments: Monofilament test negative   Skin:     General: Skin is warm and dry.      Coloration: Skin is not jaundiced or pale.   Neurological:      General: No focal deficit present.      Mental Status: He is alert and oriented to person, place, and time.      Motor: No weakness.      Gait: Gait normal.   Psychiatric:         Mood and Affect: Mood normal.         Behavior: Behavior normal.         ASSESSMENT AND PLAN:     Problem List Items Addressed This Visit       Diabetes (HCC)     Patient with recently diagnosed, suspected type 1? DM. Most recent hemoglobin A1c was 10.6%.    - Reviewed routine follow-ups with patient:    *Monofilament test (each visit): completed today with normal findings    *Hemoglobin A1C (q3 months): up-to-date, completed August/2024.    *Lipid panel (yearly): up-to-date, completed August/2024.    *Urine micro-albumin (yearly): past due, new lab order placed today    *Podiatry Exam (yearly): n/a. Normal monofilament exam.    *Eye Exam (yearly): patient has upcoming eye exam scheduled, will bring in results    *Dentist (biannually): up-to-date. Completed March/2024    - Continued Metformin (currently at 1500, with plan to get to 2g next week)  - Increased  dose of Insulin: (pt was self-titrating and taking 10u bid depending on his sugars) plan: glargine to 20u nightly  - Discussed current medications, side effects and importance of compliance  - Reviewed most recent lab results with patient  - Reviewed hyperglycemic symptoms (polydipsia, dry mouth, lethargy, blurred vision, headache, N/V, confusion) to look out for with patient  - Emphasized importance of completing regular follow-ups as noted above  - Emphasized importance of daily foot exams and foot care  - Emphasized important of daily aerobic exercise  - Recommended low carb and low cholesterol diet  - Pending Urine micro-albumin creatinine ratio, TSH with reflex T4, SHEY-65, and Islet cell antibody  - recommended checking fasting blood surgar in mornings to help us titrate medications         Relevant Medications    insulin glargine 100 UNIT/ML SC SOLN    Other Relevant Orders    Referral to Nutrition Services    Essential hypertension     116/80 today  - continue prinzide            Orders Placed This Encounter    Referral to Nutrition Services    insulin glargine 100 UNIT/ML SC SOLN     Return in about 1 week (around 8/16/2024).    Jeana Lam M.D.  [facility] Resident

## 2024-08-09 NOTE — PATIENT INSTRUCTIONS
Start with taking glargine 20 units nightly.   Every morning, check your blood sugar, if it is >180 - then increase your glargine dose by 2 units for that night.  Dont go higher than 30 units nightly  Continue this until your follow up appointment in 1 week

## 2024-08-09 NOTE — ASSESSMENT & PLAN NOTE
Patient with recently diagnosed, suspected type 1? DM. Most recent hemoglobin A1c was 10.6%.    - Reviewed routine follow-ups with patient:    *Monofilament test (each visit): completed today with normal findings    *Hemoglobin A1C (q3 months): up-to-date, completed August/2024.    *Lipid panel (yearly): up-to-date, completed August/2024.    *Urine micro-albumin (yearly): past due, new lab order placed today    *Podiatry Exam (yearly): n/a. Normal monofilament exam.    *Eye Exam (yearly): patient has upcoming eye exam scheduled, will bring in results    *Dentist (biannually): up-to-date. Completed March/2024    - Continued Metformin (currently at 1500, with plan to get to 2g next week)  - Increased dose of Insulin: (pt was self-titrating and taking 10u bid depending on his sugars) plan: glargine to 20u nightly  - Discussed current medications, side effects and importance of compliance  - Reviewed most recent lab results with patient  - Reviewed hyperglycemic symptoms (polydipsia, dry mouth, lethargy, blurred vision, headache, N/V, confusion) to look out for with patient  - Emphasized importance of completing regular follow-ups as noted above  - Emphasized importance of daily foot exams and foot care  - Emphasized important of daily aerobic exercise  - Recommended low carb and low cholesterol diet  - Pending Urine micro-albumin creatinine ratio, TSH with reflex T4, SHEY-65, and Islet cell antibody  - recommended checking fasting blood surgar in mornings to help us titrate medications

## 2024-08-10 LAB
GAD65 AB SER IA-ACNC: <5 IU/ML (ref 0–5)
PANC ISLET CELL AB TITR SER: NORMAL {TITER}

## 2024-08-12 LAB — ISLET CELL512 AB SER IA-ACNC: <5.4 U/ML (ref 0–7.4)

## 2024-08-16 ENCOUNTER — OFFICE VISIT (OUTPATIENT)
Dept: INTERNAL MEDICINE | Facility: OTHER | Age: 49
End: 2024-08-16
Payer: COMMERCIAL

## 2024-08-16 ENCOUNTER — TELEPHONE (OUTPATIENT)
Dept: INTERNAL MEDICINE | Facility: OTHER | Age: 49
End: 2024-08-16

## 2024-08-16 VITALS
DIASTOLIC BLOOD PRESSURE: 80 MMHG | HEART RATE: 106 BPM | TEMPERATURE: 98 F | WEIGHT: 235 LBS | OXYGEN SATURATION: 91 % | BODY MASS INDEX: 28.62 KG/M2 | SYSTOLIC BLOOD PRESSURE: 119 MMHG | HEIGHT: 76 IN

## 2024-08-16 DIAGNOSIS — E13.9 OTHER SPECIFIED DIABETES MELLITUS WITHOUT COMPLICATION, WITHOUT LONG-TERM CURRENT USE OF INSULIN (HCC): ICD-10-CM

## 2024-08-16 DIAGNOSIS — K50.919 CROHN'S DISEASE WITH COMPLICATION, UNSPECIFIED GASTROINTESTINAL TRACT LOCATION (HCC): ICD-10-CM

## 2024-08-16 DIAGNOSIS — F41.1 GAD (GENERALIZED ANXIETY DISORDER): ICD-10-CM

## 2024-08-16 DIAGNOSIS — I10 ESSENTIAL HYPERTENSION: ICD-10-CM

## 2024-08-16 PROCEDURE — 99214 OFFICE O/P EST MOD 30 MIN: CPT | Mod: GC

## 2024-08-16 RX ORDER — DULAGLUTIDE 0.75 MG/.5ML
0.5 INJECTION, SOLUTION SUBCUTANEOUS
Qty: 1.96 ML | Refills: 1 | Status: SHIPPED | OUTPATIENT
Start: 2024-08-16

## 2024-08-16 ASSESSMENT — ENCOUNTER SYMPTOMS
DIZZINESS: 1
FEVER: 0
HEADACHES: 0
PALPITATIONS: 0
MYALGIAS: 0
SHORTNESS OF BREATH: 0
BACK PAIN: 0
BLURRED VISION: 1
SORE THROAT: 0
CHILLS: 0
COUGH: 0
VOMITING: 0
NAUSEA: 0
ABDOMINAL PAIN: 0

## 2024-08-16 NOTE — PROGRESS NOTES
Chief Complaint: Follow-up for diabetes    Last Seen: 8/9/2024    History of Present Illness:   Mason Craig is a 49 y.o. male with PMH relevant for diabetes who presents with follow-up for diabetes.  Patient states that his blood sugars at home have been ranging 360-450.  His lowest glucose was 256.  He has uptitrated his glargine to 28 units yesterday and his sugars have continued to be high.  His highest sugar was 600.  He does also report associated symptoms of bilateral leg cramping, fatigue, headaches, blurry vision, dizziness, and extreme thirst.  He just started 2 g metformin today.       Past Medical History:   Diagnosis Date    ADHD     Crohn's disease (HCC)     Hypertension     krones        Past Surgical History:   Procedure Laterality Date    APPENDECTOMY      COLON RESECTION      OTHER ABDOMINAL SURGERY      OTHER ORTHOPEDIC SURGERY      RESECTION, PARTIAL SMALL BOWEL         Family History   Problem Relation Age of Onset    No Known Problems Mother     No Known Problems Father     Cancer Neg Hx     Diabetes Neg Hx     Hypertension Neg Hx     Hyperlipidemia Neg Hx     Stroke Neg Hx     Heart Disease Neg Hx        Social History     Socioeconomic History    Marital status: Single     Spouse name: Not on file    Number of children: Not on file    Years of education: Not on file    Highest education level: Not on file   Occupational History    Not on file   Tobacco Use    Smoking status: Never    Smokeless tobacco: Never   Vaping Use    Vaping status: Never Used   Substance and Sexual Activity    Alcohol use: Yes     Comment: occ     Drug use: No    Sexual activity: Yes     Partners: Female     Birth control/protection: Condom   Other Topics Concern    Not on file   Social History Narrative    Not on file     Social Determinants of Health     Financial Resource Strain: Not on file   Food Insecurity: Not on file   Transportation Needs: Not on file   Physical Activity: Not on file   Stress: Not on  file   Social Connections: Not on file   Intimate Partner Violence: Not on file   Housing Stability: Not on file       Current Outpatient Medications   Medication Sig Dispense Refill    Dulaglutide (TRULICITY) 0.75 MG/0.5ML Solution Pen-injector Inject 0.5 mL under the skin every 7 days. 1.96 mL 1    insulin glargine 100 UNIT/ML SC SOLN Inject 20 Units under the skin every evening. 10 mL 0    busPIRone (BUSPAR) 10 MG Tab tablet Take 1 Tablet by mouth every day for 90 days. TAKE 1/2 (ONE-HALF) TABLET BY MOUTH TWICE DAILY FOR 7 DAYS THEN TAKE 1 TABLET TWICE DAILY 90 Tablet 0    Blood Glucose Meter Kit Test blood sugar as recommended by provider. As covered by insurance blood glucose monitoring kit. 1 Kit 0    Blood Glucose Test Strips Use one as covered by insurance strip to test blood sugar three times daily. 100 Strip 0    Lancets Use one as covered by insurance lancet to test blood sugar three times daily before meals. 100 Each 0    Alcohol Swabs Wipe site with prep pad prior to injection. 100 Each 0    Insulin Pen Needle 32 G x 4 mm Use one pen needle in pen device to inject insulin once daily. 100 Each 0    metFORMIN ER (GLUCOPHAGE XR) 500 MG TABLET SR 24 HR Take 1 Tablet by mouth every day for 7 days, THEN 2 Tablets every day for 7 days, THEN 3 Tablets every day for 7 days, THEN 4 Tablets every day for 7 days. 70 Tablet 0    traZODone (DESYREL) 100 MG Tab Take 1 Tablet by mouth every evening. 30 Tablet 0    prazosin (MINIPRESS) 2 MG Cap Take 1 Capsule by mouth every evening. 30 Capsule 3    cyclobenzaprine (FLEXERIL) 5 mg tablet Take 1 Tablet by mouth 2 times a day as needed for Muscle Spasms or Moderate Pain. 30 Tablet 0    lisinopril-hydrochlorothiazide (PRINZIDE) 20-25 MG per tablet Take 1 Tablet by mouth every day. 30 Tablet 3     No current facility-administered medications for this visit.       No Known Allergies    Review of Systems:  Review of Systems   Constitutional:  Positive for malaise/fatigue.  "Negative for chills and fever.   HENT:  Negative for congestion and sore throat.    Eyes:  Positive for blurred vision.   Respiratory:  Negative for cough and shortness of breath.    Cardiovascular:  Negative for chest pain and palpitations.   Gastrointestinal:  Negative for abdominal pain, nausea and vomiting.   Genitourinary:  Negative for dysuria and hematuria.   Musculoskeletal:  Negative for back pain and myalgias.   Skin:  Negative for itching and rash.   Neurological:  Positive for dizziness. Negative for headaches.        Medications:     Current Outpatient Medications:     Trulicity, 0.5 mL, Subcutaneous, Q7 DAYS    insulin glargine, 20 Units, Subcutaneous, Q EVENING, Taking    busPIRone, 10 mg, Oral, DAILY, Taking    Blood Glucose Meter Kit, Test blood sugar as recommended by provider. As covered by insurance blood glucose monitoring kit., Taking    Blood Glucose Test Strips, Use one as covered by insurance strip to test blood sugar three times daily., Taking    Lancets, Use one as covered by insurance lancet to test blood sugar three times daily before meals., Taking    Alcohol Swabs, Wipe site with prep pad prior to injection., Taking    Insulin Pen Needle 32 G x 4 mm, Use one pen needle in pen device to inject insulin once daily., Taking    metFORMIN ER, Take 1 Tablet by mouth every day for 7 days, THEN 2 Tablets every day for 7 days, THEN 3 Tablets every day for 7 days, THEN 4 Tablets every day for 7 days., Taking    traZODone, 100 mg, Oral, Q EVENING, Taking    prazosin, 2 mg, Oral, Nightly, Taking    cyclobenzaprine, 5 mg, Oral, BID PRN, PRN    lisinopril-hydrochlorothiazide, 1 Tablet, Oral, DAILY, Taking     Objective:  Vitals:   /80 (BP Location: Right arm, Patient Position: Sitting, BP Cuff Size: Adult)   Pulse (!) 106   Temp 36.7 °C (98 °F) (Temporal)   Ht 1.93 m (6' 4\")   Wt 107 kg (235 lb)   SpO2 91%  Body mass index is 28.61 kg/m².    Physical Exam  Constitutional:       General: He " is not in acute distress.     Appearance: Normal appearance.   HENT:      Head: Normocephalic and atraumatic.   Eyes:      Extraocular Movements: Extraocular movements intact.      Pupils: Pupils are equal, round, and reactive to light.   Cardiovascular:      Rate and Rhythm: Normal rate and regular rhythm.      Heart sounds: Normal heart sounds.   Pulmonary:      Effort: No respiratory distress.      Breath sounds: Normal breath sounds.   Abdominal:      General: There is no distension.      Palpations: Abdomen is soft.   Neurological:      General: No focal deficit present.      Mental Status: He is oriented to person, place, and time.          Results:    Lab Results   Component Value Date/Time    CHOLSTRLTOT 198 08/07/2024 04:17 PM    LDL 76 08/07/2024 04:17 PM    HDL 44 08/07/2024 04:17 PM    TRIGLYCERIDE 389 (H) 08/07/2024 04:17 PM       Lab Results   Component Value Date/Time    SODIUM 133 (L) 08/07/2024 04:17 PM    POTASSIUM 4.6 08/07/2024 04:17 PM    CHLORIDE 94 (L) 08/07/2024 04:17 PM    CO2 26 08/07/2024 04:17 PM    GLUCOSE 363 (H) 08/07/2024 04:17 PM    BUN 24 (H) 08/07/2024 04:17 PM    CREATININE 0.80 08/07/2024 04:17 PM    CREATININE 1.1 06/26/2008 08:45 AM     Lab Results   Component Value Date/Time    ALKPHOSPHAT 55 06/26/2008 08:45 AM    ASTSGOT 60 (H) 06/26/2008 08:45 AM    ALTSGPT 150 (H) 06/26/2008 08:45 AM    TBILIRUBIN 1.0 06/26/2008 08:45 AM        Lab Results   Component Value Date/Time    WBC 7.7 06/26/2008 08:45 AM    RBC NOT AVAIL. 05/16/2009 05:51 PM    RBC 5.13 06/26/2008 08:45 AM    HEMOGLOBIN 16.0 06/26/2008 08:45 AM    HEMATOCRIT 44.0 06/26/2008 08:45 AM    MCV 85.7 06/26/2008 08:45 AM    MCH 31.1 06/26/2008 08:45 AM    MCHC 36.3 (H) 06/26/2008 08:45 AM    MPV 7.2 06/26/2008 08:45 AM    NEUTSPOLYS 75.2 (H) 06/26/2008 08:45 AM    LYMPHOCYTES 17.6 (L) 06/26/2008 08:45 AM    MONOCYTES 5.0 06/26/2008 08:45 AM    EOSINOPHILS NOT AVAIL. 05/16/2009 05:51 PM    BASOPHILS 0.5 06/26/2008 08:45  AM        Assessment and Plan:    #Type 2 diabetes  A1c 10.6%  C-peptide 4.9  Currently on 28 units glargine and 2 g metformin  Sugars not well-controlled, ranging 300s to 400s.  -Initiate Trulicity 0.75 mg weekly  -Advised patient if able to obtain Trulicity, to decrease to half dose metformin (1g) to minimize GI side effects  -Also advised to stop self titrating glargine if able to obtain Trulicity prescription. Titration set to increase by 2 units every 2-3 days if fasting morning glucose > 160  -Given uncontrolled sugars on her regimen, will refer to endocrinology for further management  -Strict return precautions given    Follow Up:  Return in about 2 weeks (around 8/30/2024).

## 2024-08-17 NOTE — TELEPHONE ENCOUNTER
DOCUMENTATION OF PAR STATUS:    1. Name of Medication & Dose: Trulicity     2. Name of Prescription Coverage Company & phone #: [employer]    3. Date Prior Auth Submitted: 8/16/24    4. What information was given to obtain insurance decision? Notes, a1c    5. Prior Auth Status? Pending    6. Patient Notified: N\A

## 2024-08-19 DIAGNOSIS — E13.69 OTHER SPECIFIED DIABETES MELLITUS WITH OTHER SPECIFIED COMPLICATION, UNSPECIFIED WHETHER LONG TERM INSULIN USE (HCC): ICD-10-CM

## 2024-08-19 NOTE — TELEPHONE ENCOUNTER
Received request via: Pharmacy    Was the patient seen in the last year in this department? Yes    Does the patient have an active prescription (recently filled or refills available) for medication(s) requested? No    Pharmacy Name: [facility]     Does the patient have skilled nursing Plus and need 100-day supply? (This applies to ALL medications) Patient does not have SCP

## 2024-08-19 NOTE — TELEPHONE ENCOUNTER
FINAL PRIOR AUTHORIZATION STATUS:    1.  Name of Medication & Dose: Trulicity      2. Prior Auth Status: Denied.  Reason: unmet criteria. DX of type 2 DM, Bydureon Bcise, trulicity and victoza for at least 10 year of trail will allow for approval. Denial letter scanned for more approval criteria.     3. Action Taken: Pharmacy Notified: N\A Patient Notified: N\A

## 2024-08-21 DIAGNOSIS — E13.69 OTHER SPECIFIED DIABETES MELLITUS WITH OTHER SPECIFIED COMPLICATION, UNSPECIFIED WHETHER LONG TERM INSULIN USE (HCC): ICD-10-CM

## 2024-08-22 DIAGNOSIS — E13.69 OTHER SPECIFIED DIABETES MELLITUS WITH OTHER SPECIFIED COMPLICATION, UNSPECIFIED WHETHER LONG TERM INSULIN USE (HCC): ICD-10-CM

## 2024-08-22 RX ORDER — CYCLOBENZAPRINE HCL 5 MG
5 TABLET ORAL 2 TIMES DAILY PRN
Qty: 30 TABLET | Refills: 0 | Status: SHIPPED | OUTPATIENT
Start: 2024-08-22

## 2024-08-22 RX ORDER — METFORMIN HCL 500 MG
TABLET, EXTENDED RELEASE 24 HR ORAL
Qty: 70 TABLET | Refills: 0 | Status: SHIPPED | OUTPATIENT
Start: 2024-08-22 | End: 2024-08-30

## 2024-08-22 RX ORDER — LANCETS 33 GAUGE
EACH MISCELLANEOUS
Qty: 100 EACH | Refills: 3 | Status: SHIPPED | OUTPATIENT
Start: 2024-08-22

## 2024-08-22 NOTE — TELEPHONE ENCOUNTER
Received request via: Pharmacy    Was the patient seen in the last year in this department? Yes    Does the patient have an active prescription (recently filled or refills available) for medication(s) requested? No    Pharmacy Name: [facility]    Does the patient have [facility] Plus and need 100-day supply? (This applies to ALL medications) Patient does not have SCP

## 2024-08-22 NOTE — TELEPHONE ENCOUNTER
Received request via: Patient    Was the patient seen in the last year in this department? Yes    Does the patient have an active prescription (recently filled or refills available) for medication(s) requested? No    Pharmacy Name: [facility]    Does the patient have [facility] Plus and need 100-day supply? (This applies to ALL medications) Patient does not have SCP

## 2024-08-30 ENCOUNTER — OFFICE VISIT (OUTPATIENT)
Dept: INTERNAL MEDICINE | Facility: OTHER | Age: 49
End: 2024-08-30
Payer: COMMERCIAL

## 2024-08-30 VITALS
OXYGEN SATURATION: 96 % | HEART RATE: 108 BPM | DIASTOLIC BLOOD PRESSURE: 82 MMHG | BODY MASS INDEX: 28.08 KG/M2 | HEIGHT: 76 IN | WEIGHT: 230.6 LBS | SYSTOLIC BLOOD PRESSURE: 129 MMHG | TEMPERATURE: 97.6 F

## 2024-08-30 DIAGNOSIS — E11.69 TYPE 2 DIABETES MELLITUS WITH OTHER SPECIFIED COMPLICATION, UNSPECIFIED WHETHER LONG TERM INSULIN USE (HCC): ICD-10-CM

## 2024-08-30 RX ORDER — GLIMEPIRIDE 2 MG/1
2 TABLET ORAL EVERY MORNING
Qty: 90 TABLET | Refills: 0 | Status: CANCELLED | OUTPATIENT
Start: 2024-08-30

## 2024-08-30 RX ORDER — DAPAGLIFLOZIN 5 MG/1
5 TABLET, FILM COATED ORAL DAILY
Qty: 90 TABLET | Refills: 0 | Status: SHIPPED | OUTPATIENT
Start: 2024-08-30 | End: 2024-11-28

## 2024-08-30 RX ORDER — INSULIN GLARGINE 100 [IU]/ML
35 INJECTION, SOLUTION SUBCUTANEOUS EVERY EVENING
Qty: 30 ML | Refills: 0 | Status: SHIPPED | OUTPATIENT
Start: 2024-08-30 | End: 2024-11-28

## 2024-08-30 RX ORDER — DAPAGLIFLOZIN 10 MG/1
10 TABLET, FILM COATED ORAL DAILY
Qty: 90 TABLET | Refills: 0 | Status: CANCELLED | OUTPATIENT
Start: 2024-08-30

## 2024-08-30 RX ORDER — BLOOD-GLUCOSE SENSOR
1 EACH MISCELLANEOUS
Qty: 2 EACH | Refills: 2 | Status: SHIPPED | OUTPATIENT
Start: 2024-08-30 | End: 2024-09-29

## 2024-08-30 ASSESSMENT — ENCOUNTER SYMPTOMS
DOUBLE VISION: 0
SORE THROAT: 0
CHILLS: 0
WHEEZING: 0
FEVER: 0
ABDOMINAL PAIN: 0
HEADACHES: 0
SPUTUM PRODUCTION: 0
STRIDOR: 0
VOMITING: 0
PALPITATIONS: 0
BLURRED VISION: 0
DEPRESSION: 0
NECK PAIN: 0
COUGH: 0
MYALGIAS: 0
DIARRHEA: 0
SHORTNESS OF BREATH: 0
DIZZINESS: 0

## 2024-08-30 ASSESSMENT — PAIN SCALES - GENERAL: PAINLEVEL: NO PAIN

## 2024-08-30 NOTE — PATIENT INSTRUCTIONS
Please follow up for your labs before your next visit.  [facility]   Please follow up in 5 months.   Please increase your glargine every 2 to 3 days until your fasting sugar is 160

## 2024-08-30 NOTE — PROGRESS NOTES
Chief Complaint   Patient presents with    Diabetes Mellitus    Hypertension    Crohn's Disease       HISTORY OF PRESENT ILLNESS: Patient is a 49 y.o. male established patient who presents today for the following.      He is here for follow-up on his type 2 diabetes.  His diabetes has remained uncontrolled, he has been on insulin therapy and metformin.  He was recently seen by my colleague and was prescribed Trulicity however it was denied by his insurance.  He is currently taking 2 g of metformin a day and glargine 30 units nightly, he states his fasting sugars are running in the 300s, he states in the last 10 days the lowest he seen is in the 250s highest has been in the low 400s.  His sugars have been very labile throughout the day, he states after his meals his sugars actually dropped into the 100s, after exercising his sugars are rising up to the 2-3 100s.  He states he plays golf, denies any open wounds or cracks in his feet.  He does state he continues to have polyuria which is bothersome, he also states he has cut back on a lot of the sugars in his diet however does admit to drinking a root beer  The other night.      Past Medical History:   Diagnosis Date    ADHD     Crohn's disease ([facility])     Hypertension     krones        Patient Active Problem List    Diagnosis Date Noted    Diabetes ([facility]) 08/09/2024    Preventative health care 06/04/2024    Abdominal hernia 04/23/2024    Hypomania ([facility]) 08/18/2023    Erectile dysfunction 08/15/2023    Insomnia 12/05/2022    SHEY (generalized anxiety disorder) 08/20/2021    Crohn's disease ([facility]) 11/04/2020    Essential hypertension 11/04/2020       Allergies: Patient has no known allergies.    Current Outpatient Medications   Medication Sig Dispense Refill    Continuous Glucose Sensor (FREESTYLE NAKUL 3 SENSOR) Misc 1 Application every 14 days for 30 days. 2 Each 2    insulin glargine 100 UNIT/ML SC SOLN Inject 35 Units under the skin every evening for 90 days. 30 mL 0     dapagliflozin propanediol (FARXIGA) 5 MG Tab Take 1 Tablet by mouth every day for 90 days. 90 Tablet 0    metformin (GLUCOPHAGE) 1000 MG tablet Take 1 Tablet by mouth 2 times a day with meals for 90 days. 180 Tablet 0    cyclobenzaprine (FLEXERIL) 5 mg tablet Take 1 Tablet by mouth 2 times a day as needed for Muscle Spasms or Moderate Pain. 30 Tablet 0    Lancets (ONETOUCH DELICA PLUS [id number]) Misc CHECK GLUCOSE THREE TIMES DAILY BEFORE MEALS 100 Each 3    Blood Glucose Test Strips USE 1 STRIP TO CHECK GLUCOSE THREE TIMES DAILY 300 Strip 0    Dulaglutide (TRULICITY) 0.75 MG/0.5ML Solution Pen-injector Inject 0.5 mL under the skin every 7 days. 1.96 mL 1    busPIRone (BUSPAR) 10 MG Tab tablet Take 1 Tablet by mouth every day for 90 days. TAKE 1/2 (ONE-HALF) TABLET BY MOUTH TWICE DAILY FOR 7 DAYS THEN TAKE 1 TABLET TWICE DAILY 90 Tablet 0    Blood Glucose Meter Kit Test blood sugar as recommended by provider. As covered by insurance blood glucose monitoring kit. 1 Kit 0    Alcohol Swabs Wipe site with prep pad prior to injection. 100 Each 0    Insulin Pen Needle 32 G x 4 mm Use one pen needle in pen device to inject insulin once daily. 100 Each 0    traZODone (DESYREL) 100 MG Tab Take 1 Tablet by mouth every evening. 30 Tablet 0    prazosin (MINIPRESS) 2 MG Cap Take 1 Capsule by mouth every evening. 30 Capsule 3    lisinopril-hydrochlorothiazide (PRINZIDE) 20-25 MG per tablet Take 1 Tablet by mouth every day. 30 Tablet 3     No current facility-administered medications for this visit.       Social History     Tobacco Use    Smoking status: Never    Smokeless tobacco: Never   Vaping Use    Vaping status: Never Used   Substance Use Topics    Alcohol use: Yes     Comment: occ     Drug use: No       Family History   Problem Relation Age of Onset    No Known Problems Mother     No Known Problems Father     Cancer Neg Hx     Diabetes Neg Hx     Hypertension Neg Hx     Hyperlipidemia Neg Hx     Stroke Neg Hx     Heart  "Disease Neg Hx          Review of Systems   Review of Systems   Constitutional:  Positive for malaise/fatigue. Negative for chills and fever.   HENT:  Negative for sore throat.    Eyes:  Negative for blurred vision and double vision.   Respiratory:  Negative for cough, sputum production, shortness of breath, wheezing and stridor.    Cardiovascular:  Negative for chest pain and palpitations.   Gastrointestinal:  Negative for abdominal pain, diarrhea and vomiting.   Genitourinary:  Negative for dysuria and urgency.   Musculoskeletal:  Negative for myalgias and neck pain.   Neurological:  Negative for dizziness and headaches.   Psychiatric/Behavioral:  Negative for depression.        Exam:  /82 (BP Location: Left arm, Patient Position: Sitting, BP Cuff Size: Large adult)   Pulse (!) 108   Temp 36.4 °C (97.6 °F) (Temporal)   Ht 1.93 m (6' 4\")   Wt 105 kg (230 lb 9.6 oz)   SpO2 96%  Body mass index is 28.07 kg/m².    Constitutional:  Not in acute distress, well appearing.  HEENT:   NC/AT  Cardiovascular: Regular rate and rhythm. No murmurs or gallops.      Lungs:   Clear to auscultation bilaterally. No wheezes or crackles. No respiratory distress.  Abdomen: Not distended, soft, not tender. No guarding or rigidity. No masses.  Extremities:  No cyanosis/clubbing/edema. No obvious deformities.  Skin:  Warm and dry.  No visible rashes.  Neurologic: Alert & oriented x 3, CN II-XII grossly intact, strength and sensation grossly intact.  No focal deficits noted.  Psychiatric:  Affect normal, mood normal, judgment normal.    Assessment/Plan:     1. Type 2 diabetes mellitus with other specified complication, unspecified whether long term insulin use (HCC)  His type 2 diabetes remains uncontrolled, however it is improving, his fasting sugars are down from 4-5 100s to the low 300s.  At this point we will increase his glargine dosing, start him on Farxiga and ask him to titrate his glargine dosing every 2 to 3 days 2 " units until he receives a fasting sugar of 160s.  Will provide him with a referral for podiatry for annual foot checks, also will prescribe a CGM to better understand patient's labile sugars.  Asked to follow-up in 5 weeks for further titration of medications.  -Encourage patient to follow-up with endocrinology  - Continuous Glucose Sensor (FREESTYLE NAKUL 3 SENSOR) Misc; 1 Application every 14 days for 30 days.  Dispense: 2 Each; Refill: 2  - insulin glargine 100 UNIT/ML SC SOLN; Inject 35 Units under the skin every evening for 90 days.  Dispense: 30 mL; Refill: 0  - Referral for Retinal Screening Exam  - dapagliflozin propanediol (FARXIGA) 5 MG Tab; Take 1 Tablet by mouth every day for 90 days.  Dispense: 90 Tablet; Refill: 0  - metformin (GLUCOPHAGE) 1000 MG tablet; Take 1 Tablet by mouth 2 times a day with meals for 90 days.  Dispense: 180 Tablet; Refill: 0  - Referral to Podiatry      All imaging results and lab results and consult notes are reviewed at this visit.  Followup: Return in about 5 weeks (around 10/4/2024).    Please note that this dictation was created using voice recognition software. I have made every reasonable attempt to correct obvious errors, but I expect that there are errors of grammar and possibly content that I did not discover before finalizing the note.    Gerardo Rodriguez, DO  PGY-2  Internal Medicine

## 2024-09-03 DIAGNOSIS — F41.1 GAD (GENERALIZED ANXIETY DISORDER): ICD-10-CM

## 2024-09-04 RX ORDER — BUSPIRONE HYDROCHLORIDE 10 MG/1
10 TABLET ORAL DAILY
Qty: 90 TABLET | Refills: 0 | Status: SHIPPED | OUTPATIENT
Start: 2024-09-04 | End: 2024-12-03

## 2024-09-04 NOTE — TELEPHONE ENCOUNTER
Received request via: Pharmacy    Was the patient seen in the last year in this department? Yes    Does the patient have an active prescription (recently filled or refills available) for medication(s) requested? No    Pharmacy Name: [facility]     Does the patient have half-way Plus and need 100-day supply? (This applies to ALL medications) Patient does not have SCP

## 2024-09-11 DIAGNOSIS — G47.00 INSOMNIA, UNSPECIFIED TYPE: ICD-10-CM

## 2024-09-12 RX ORDER — TRAZODONE HYDROCHLORIDE 100 MG/1
100 TABLET ORAL EVERY EVENING
Qty: 90 TABLET | Refills: 0 | Status: SHIPPED | OUTPATIENT
Start: 2024-09-12

## 2024-09-12 NOTE — TELEPHONE ENCOUNTER
Received request via: Patient    Was the patient seen in the last year in this department? Yes    Does the patient have an active prescription (recently filled or refills available) for medication(s) requested? No    Pharmacy Name: [facility] [address] [place], [place] [address] [facility]     Does the patient have snf Plus and need 100-day supply? (This applies to ALL medications) Patient does not have SCP

## 2024-10-03 DIAGNOSIS — F41.1 GAD (GENERALIZED ANXIETY DISORDER): ICD-10-CM

## 2024-10-04 LAB — ZNT8 AB SERPL IA-ACNC: <10 U/ML (ref 0–15)

## 2024-10-04 RX ORDER — BUSPIRONE HYDROCHLORIDE 10 MG/1
10 TABLET ORAL DAILY
Qty: 90 TABLET | Refills: 0 | Status: SHIPPED | OUTPATIENT
Start: 2024-10-04 | End: 2025-01-02

## 2024-10-07 ENCOUNTER — OFFICE VISIT (OUTPATIENT)
Dept: INTERNAL MEDICINE | Facility: OTHER | Age: 49
End: 2024-10-07
Payer: COMMERCIAL

## 2024-10-07 VITALS
SYSTOLIC BLOOD PRESSURE: 126 MMHG | TEMPERATURE: 97.7 F | DIASTOLIC BLOOD PRESSURE: 79 MMHG | BODY MASS INDEX: 29.57 KG/M2 | HEART RATE: 87 BPM | WEIGHT: 242.8 LBS | HEIGHT: 76 IN | OXYGEN SATURATION: 92 %

## 2024-10-07 DIAGNOSIS — Z23 NEED FOR INFLUENZA VACCINATION: Primary | ICD-10-CM

## 2024-10-07 DIAGNOSIS — E11.69 TYPE 2 DIABETES MELLITUS WITH OTHER SPECIFIED COMPLICATION, UNSPECIFIED WHETHER LONG TERM INSULIN USE (HCC): ICD-10-CM

## 2024-10-07 DIAGNOSIS — G47.00 INSOMNIA, UNSPECIFIED TYPE: ICD-10-CM

## 2024-10-07 DIAGNOSIS — Z23 NEED FOR PNEUMOCOCCAL 20-VALENT CONJUGATE VACCINATION: ICD-10-CM

## 2024-10-07 DIAGNOSIS — E78.1 HYPERTRIGLYCERIDEMIA: ICD-10-CM

## 2024-10-07 PROBLEM — Z79.4 TYPE 2 DIABETES MELLITUS WITH HYPERGLYCEMIA, WITH LONG-TERM CURRENT USE OF INSULIN (HCC): Status: ACTIVE | Noted: 2024-08-09

## 2024-10-07 PROBLEM — E11.65 TYPE 2 DIABETES MELLITUS WITH HYPERGLYCEMIA, WITH LONG-TERM CURRENT USE OF INSULIN (HCC): Status: ACTIVE | Noted: 2024-08-09

## 2024-10-07 PROCEDURE — 90471 IMMUNIZATION ADMIN: CPT | Mod: GE

## 2024-10-07 PROCEDURE — 99213 OFFICE O/P EST LOW 20 MIN: CPT | Mod: 25,GE

## 2024-10-07 PROCEDURE — 90656 IIV3 VACC NO PRSV 0.5 ML IM: CPT | Mod: GE

## 2024-10-07 PROCEDURE — 90677 PCV20 VACCINE IM: CPT | Mod: GE

## 2024-10-07 PROCEDURE — 90472 IMMUNIZATION ADMIN EACH ADD: CPT | Mod: GE

## 2024-10-07 RX ORDER — INSULIN GLARGINE 100 [IU]/ML
25 INJECTION, SOLUTION SUBCUTANEOUS EVERY EVENING
Qty: 22.5 ML | Refills: 0
Start: 2024-10-07 | End: 2025-01-05

## 2024-10-07 RX ORDER — BLOOD SUGAR DIAGNOSTIC
STRIP MISCELLANEOUS
COMMUNITY
Start: 2024-08-19

## 2024-10-07 RX ORDER — DAPAGLIFLOZIN 10 MG/1
10 TABLET, FILM COATED ORAL DAILY
Qty: 90 TABLET | Refills: 0 | Status: SHIPPED | OUTPATIENT
Start: 2024-10-07

## 2024-10-07 ASSESSMENT — ENCOUNTER SYMPTOMS
DIZZINESS: 0
ABDOMINAL PAIN: 0
STRIDOR: 0
DEPRESSION: 0
DOUBLE VISION: 0
CHILLS: 0
SORE THROAT: 0
SPUTUM PRODUCTION: 0
PALPITATIONS: 0
SHORTNESS OF BREATH: 0
HEADACHES: 0
VOMITING: 0
MYALGIAS: 0
DIARRHEA: 0
NECK PAIN: 0
COUGH: 0
BLURRED VISION: 0
WHEEZING: 0
FEVER: 0

## 2024-10-08 RX ORDER — CYCLOBENZAPRINE HCL 5 MG
5 TABLET ORAL 2 TIMES DAILY PRN
Qty: 30 TABLET | Refills: 0 | Status: SHIPPED | OUTPATIENT
Start: 2024-10-08

## 2024-10-08 RX ORDER — TRAZODONE HYDROCHLORIDE 100 MG/1
100 TABLET ORAL EVERY EVENING
Qty: 90 TABLET | Refills: 0 | Status: SHIPPED | OUTPATIENT
Start: 2024-10-08

## 2024-11-02 DIAGNOSIS — F41.1 GAD (GENERALIZED ANXIETY DISORDER): ICD-10-CM

## 2024-11-04 RX ORDER — BUSPIRONE HYDROCHLORIDE 10 MG/1
TABLET ORAL
Qty: 90 TABLET | Refills: 0 | Status: SHIPPED | OUTPATIENT
Start: 2024-11-04

## 2024-11-04 NOTE — TELEPHONE ENCOUNTER
Received request via: Pharmacy    Was the patient seen in the last year in this department? Yes    Does the patient have an active prescription (recently filled or refills available) for medication(s) requested? No    Pharmacy Name:   To be filled at: [facility] [address] [place] [address] [facility]          Does the patient have group home Plus and need 100-day supply? (This applies to ALL medications) Patient does not have SCP

## 2024-11-05 RX ORDER — CYCLOBENZAPRINE HCL 5 MG
5 TABLET ORAL 2 TIMES DAILY PRN
Qty: 30 TABLET | Refills: 0 | Status: SHIPPED | OUTPATIENT
Start: 2024-11-05

## 2024-11-05 NOTE — TELEPHONE ENCOUNTER
Received request via: Pharmacy    Was the patient seen in the last year in this department? Yes    Does the patient have an active prescription (recently filled or refills available) for medication(s) requested? No    [facility]    Does the patient have [facility] Plus and need 100-day supply? (This applies to ALL medications) Patient does not have SCP

## 2024-11-10 DIAGNOSIS — E13.69 OTHER SPECIFIED DIABETES MELLITUS WITH OTHER SPECIFIED COMPLICATION, UNSPECIFIED WHETHER LONG TERM INSULIN USE (HCC): ICD-10-CM

## 2024-11-11 ENCOUNTER — OFFICE VISIT (OUTPATIENT)
Dept: ENDOCRINOLOGY | Facility: MEDICAL CENTER | Age: 49
End: 2024-11-11
Payer: COMMERCIAL

## 2024-11-11 VITALS
DIASTOLIC BLOOD PRESSURE: 68 MMHG | BODY MASS INDEX: 30.56 KG/M2 | HEART RATE: 86 BPM | OXYGEN SATURATION: 94 % | WEIGHT: 251 LBS | HEIGHT: 76 IN | SYSTOLIC BLOOD PRESSURE: 130 MMHG

## 2024-11-11 DIAGNOSIS — E11.65 TYPE 2 DIABETES MELLITUS WITH HYPERGLYCEMIA, WITH LONG-TERM CURRENT USE OF INSULIN (HCC): ICD-10-CM

## 2024-11-11 DIAGNOSIS — Z13.21 ENCOUNTER FOR VITAMIN DEFICIENCY SCREENING: ICD-10-CM

## 2024-11-11 DIAGNOSIS — Z79.4 TYPE 2 DIABETES MELLITUS WITH HYPERGLYCEMIA, WITH LONG-TERM CURRENT USE OF INSULIN (HCC): ICD-10-CM

## 2024-11-11 LAB
HBA1C MFR BLD: 7.3 % (ref ?–5.8)
POCT INT CON NEG: NEGATIVE
POCT INT CON POS: POSITIVE

## 2024-11-11 PROCEDURE — 3075F SYST BP GE 130 - 139MM HG: CPT

## 2024-11-11 PROCEDURE — 3078F DIAST BP <80 MM HG: CPT

## 2024-11-11 PROCEDURE — 83036 HEMOGLOBIN GLYCOSYLATED A1C: CPT

## 2024-11-11 PROCEDURE — 99212 OFFICE O/P EST SF 10 MIN: CPT

## 2024-11-11 PROCEDURE — 99204 OFFICE O/P NEW MOD 45 MIN: CPT

## 2024-11-11 RX ORDER — ACYCLOVIR 800 MG/1
1 TABLET ORAL
Qty: 6 EACH | Refills: 3 | Status: SHIPPED | OUTPATIENT
Start: 2024-11-11

## 2024-11-11 RX ORDER — PEN NEEDLE, DIABETIC 32GX 5/32"
NEEDLE, DISPOSABLE MISCELLANEOUS
Qty: 100 EACH | Refills: 0 | Status: SHIPPED | OUTPATIENT
Start: 2024-11-11

## 2024-11-11 RX ORDER — DULAGLUTIDE 0.75 MG/.5ML
1 INJECTION, SOLUTION SUBCUTANEOUS
Qty: 6 ML | Refills: 3 | Status: SHIPPED | OUTPATIENT
Start: 2024-11-11

## 2024-11-11 NOTE — TELEPHONE ENCOUNTER
Received request via: Pharmacy    Was the patient seen in the last year in this department? Yes    Does the patient have an active prescription (recently filled or refills available) for medication(s) requested? No    Pharmacy Name: [facility] [address] [place], [place] [id number] [facility]      Does the patient have [facility] Plus and need 100-day supply? (This applies to ALL medications) Patient does not have SCP

## 2024-11-11 NOTE — PROGRESS NOTES
"Chief Complaint:  Consult requested by Gerardo Rodriguez D.O. for initial evaluation of Type 2 Diabetes Mellitus    HPI:   Mason Craig is a 49 y.o. male with Type 2 Diabetes Mellitus diagnosed in july 2024.  He denies hospitalizations for DKA in the past.    Diabetes Type 2  POC A1c on 11/11/24 is 7.3  POC A1c in 08/2024 was 10.6   Latest Reference Range & Units 08/07/24 16:25   C-Peptide 0.5 - 3.3 ng/mL 4.9 (H)   IA-2, Autoantibody 0.0 - 7.4 U/mL <5.4   Islet Cell Antibody <1:4  <1:4   SHEY Antibody 0.0 - 5.0 IU/mL <5.0      Latest Reference Range & Units 08/07/24 16:25   Zinc Transporter 8 Antibody 0.0 - 15.0 U/mL <10.0      Latest Reference Range & Units 08/07/24 16:25   Insulin Fasting 3 - 25 uIU/mL 45 (H)     Current medications:  Glargine 28 units at bedtime  Metformin 2g  Farxiga 10 mg daily  Trulicity 0.75 mg weekly - not taking    He was previously on moises 3. Unable to keep it on his arm. He is now using glucose monitor. He is checking 2 -3 times a day. Fasting sugars between 250-300      He denies hypoglycemic episodes   He  denies hypoglycemic unawareness.   He denies episodes of severe hypoglycemia requiring third party assistance.    He  is not wearing a medical alert bracelet or necklace.    He does not a glucagon emergency kit.    He denies attending diabetes education classes.  Diet: \"healthy\"  Activity: plays golf..    Diabetes Complications   He  denies history of retinopathy.    He denies laser eye surgery.   Last eye exam: yearly next month    He denies history of peripheral sensory neuropathy.    He denies numbness, tingling in both feet.    He denies history of foot sores.     He denies history of kidney damage.    He is on ACE inhibitor or ARB.   Currently taking prinzide 20-25 mg daily  BP: 130/68    hypertriglyceridemia  He denies history of coronary artery disease.    He  denies history of stroke and denies TIA.    He denies history of PAD.  He reports history of hyperlipidemia.   Latest " Reference Range & Units 08/07/24 16:17   Cholesterol,Tot 100 - 199 mg/dL 198   Triglycerides 0 - 149 mg/dL 389 (H)   HDL >=40 mg/dL 44   LDL <100 mg/dL 76       ROS:     CONS:     No fever, no chills, no weight loss, no fatigue   EYES:      No diplopia, no blurry vision, no redness of eyes, no swelling of eyelids   ENT:    No hearing loss, No ear pain, No sore throat, no dysphagia, no neck swelling   CV:     No chest pain, no palpitations, no claudication, no orthopnea, no PND   PULM:    No SOB, no cough, no hemoptysis, no wheezing    GI:   No nausea, no vomiting, no diarrhea, no constipation, no bloody stools   :  Passing urine well, no dysuria, no hematuria   ENDO:   No polyuria, no polydipsia, no heat intolerance, no cold intolerance   NEURO: No headaches, no dizziness, no convulsions, no tremors   MUSC:  No joint swellings, no arthralgias, no myalgias, no weakness   SKIN:   No rash, no ulcers, no dry skin   PSYCH:   No depression, no anxiety, no difficulty sleeping       Past Medical History:  Patient Active Problem List    Diagnosis Date Noted    Type 2 diabetes mellitus with hyperglycemia, with long-term current use of insulin ([facility]) 08/09/2024    Preventative health care 06/04/2024    Abdominal hernia 04/23/2024    Hypomania ([facility]) 08/18/2023    Erectile dysfunction 08/15/2023    Insomnia 12/05/2022    SHEY (generalized anxiety disorder) 08/20/2021    Crohn's disease ([facility]) 11/04/2020    Essential hypertension 11/04/2020       Past Surgical History:  Past Surgical History:   Procedure Laterality Date    APPENDECTOMY      COLON RESECTION      OTHER ABDOMINAL SURGERY      OTHER ORTHOPEDIC SURGERY      RESECTION, PARTIAL SMALL BOWEL          Allergies:  Patient has no known allergies.     Current Medications:    Current Outpatient Medications:     Dulaglutide (TRULICITY) 0.75 MG/0.5ML Solution Auto-injector, Inject 1 Pen under the skin every 7 days. 4 pens per month, Disp: 6 mL, Rfl: 3    Continuous Glucose Sensor  (FREESTYLE NAKUL 3 SENSOR) [facility], 1 Each every 14 days., Disp: 6 Each, Rfl: 3    Insulin Pen Needle 32 G x 4 mm (BD PEN NEEDLE MARIMAR 2ND GEN), USE 1 PEN NEEDLE TO INJECT INSULIN ONCE DAILY, Disp: 100 Each, Rfl: 0    cyclobenzaprine (FLEXERIL) 5 mg tablet, Take 1 Tablet by mouth 2 times a day as needed for Muscle Spasms or Moderate Pain., Disp: 30 Tablet, Rfl: 0    busPIRone (BUSPAR) 10 MG Tab tablet, TAKE 1/2 (ONE-HALF) TABLET BY MOUTH TWICE DAILY FOR 7 DAYS THEN TAKE 1 TABLET TWICE DAILY, Disp: 90 Tablet, Rfl: 0    traZODone (DESYREL) 100 MG Tab, Take 1 Tablet by mouth every evening., Disp: 90 Tablet, Rfl: 0    dapagliflozin propanediol (FARXIGA) 10 MG Tab, Take 1 Tablet by mouth every day., Disp: 90 Tablet, Rfl: 0    insulin glargine 100 UNIT/ML SC SOLN, Inject 25 Units under the skin every evening for 90 days., Disp: 22.5 mL, Rfl: 0    ONETOUCH VERIO strip, , Disp: , Rfl:     metformin (GLUCOPHAGE) 1000 MG tablet, Take 1 Tablet by mouth 2 times a day with meals for 90 days., Disp: 180 Tablet, Rfl: 0    Lancets (ONETOUCH DELICA PLUS [id number]) Misc, CHECK GLUCOSE THREE TIMES DAILY BEFORE MEALS, Disp: 100 Each, Rfl: 3    Blood Glucose Test Strips, USE 1 STRIP TO CHECK GLUCOSE THREE TIMES DAILY, Disp: 300 Strip, Rfl: 0    Blood Glucose Meter Kit, Test blood sugar as recommended by provider. As covered by insurance blood glucose monitoring kit., Disp: 1 Kit, Rfl: 0    Alcohol Swabs, Wipe site with prep pad prior to injection., Disp: 100 Each, Rfl: 0    prazosin (MINIPRESS) 2 MG Cap, Take 1 Capsule by mouth every evening., Disp: 30 Capsule, Rfl: 3    lisinopril-hydrochlorothiazide (PRINZIDE) 20-25 MG per tablet, Take 1 Tablet by mouth every day., Disp: 30 Tablet, Rfl: 3    Social History:  Social History     Socioeconomic History    Marital status: Single     Spouse name: Not on file    Number of children: Not on file    Years of education: Not on file    Highest education level: Not on file   Occupational History     "Not on file   Tobacco Use    Smoking status: Never    Smokeless tobacco: Never   Vaping Use    Vaping status: Never Used   Substance and Sexual Activity    Alcohol use: Not Currently     Comment: occ     Drug use: No    Sexual activity: Yes     Partners: Female     Birth control/protection: Condom   Other Topics Concern    Not on file   Social History Narrative    Not on file     Social Drivers of Health     Financial Resource Strain: Not on file   Food Insecurity: Not on file   Transportation Needs: Not on file   Physical Activity: Not on file   Stress: Not on file   Social Connections: Not on file   Intimate Partner Violence: Not on file   Housing Stability: Not on file        Family History:   Family History   Problem Relation Age of Onset    No Known Problems Mother     No Known Problems Father     Cancer Neg Hx     Diabetes Neg Hx     Hypertension Neg Hx     Hyperlipidemia Neg Hx     Stroke Neg Hx     Heart Disease Neg Hx        PHYSICAL EXAM:   Vital signs: /68 (BP Location: Left arm, Patient Position: Sitting, BP Cuff Size: Adult)   Pulse 86   Ht 1.93 m (6' 4\")   Wt 114 kg (251 lb)   SpO2 94%   BMI 30.55 kg/m²   GENERAL: Well-developed, well-nourished  in no apparent distress.   EYE: No ocular and eyelid asymmetry, Anicteric sclerae,  PERRL, No exophthalmos or lidlag  HENT: Hearing grossly intact, Normocephalic, atraumatic. Pink, moist mucous membranes, No exudate  NECK: Supple. Trachea midline.   CARDIOVASCULAR: Regular rate and rhythm. No murmurs, rubs, or gallops.   LUNGS: Clear to auscultation bilaterally   ABDOMEN: Soft, nontender with positive bowel sounds.   EXTREMITIES: No clubbing, cyanosis, or edema.   NEUROLOGICAL: Cranial nerves II-XII are grossly intact   Symmetric reflexes at the patella no proximal muscle weakness, No visible tremor with both outstretched hands  LYMPH: No cervical, supraclavicular,  adenopathy palpated.   SKIN: No rashes, lesions. Turgor is normal.  FOOT: Normal " "sensation to monofilament testing, normal pulses, no ulcers.  Normal Vibration quantitative sensation test.    Labs:  Lab Results   Component Value Date/Time    HBA1C 10.6 (H) 08/07/2024 1617    AVGLUC 258 08/07/2024 1617       Lab Results   Component Value Date/Time    WBC 7.7 06/26/2008 08:45 AM    RBC NOT AVAIL. 05/16/2009 05:51 PM    RBC 5.13 06/26/2008 08:45 AM    HEMOGLOBIN 16.0 06/26/2008 08:45 AM    MCV 85.7 06/26/2008 08:45 AM    MCH 31.1 06/26/2008 08:45 AM    MCHC 36.3 (H) 06/26/2008 08:45 AM    RDW 11.6 (L) 06/26/2008 08:45 AM    MPV 7.2 06/26/2008 08:45 AM       Lab Results   Component Value Date/Time    SODIUM 133 (L) 08/07/2024 04:17 PM    POTASSIUM 4.6 08/07/2024 04:17 PM    CHLORIDE 94 (L) 08/07/2024 04:17 PM    CO2 26 08/07/2024 04:17 PM    ANION 13.0 08/07/2024 04:17 PM    GLUCOSE 363 (H) 08/07/2024 04:17 PM    BUN 24 (H) 08/07/2024 04:17 PM    CREATININE 0.80 08/07/2024 04:17 PM    CREATININE 1.1 06/26/2008 08:45 AM    CALCIUM 10.0 08/07/2024 04:17 PM    ASTSGOT 60 (H) 06/26/2008 08:45 AM    ALTSGPT 150 (H) 06/26/2008 08:45 AM    TBILIRUBIN 1.0 06/26/2008 08:45 AM    ALBUMIN 4.4 06/26/2008 08:45 AM    TOTPROTEIN 7.5 06/26/2008 08:45 AM    GLOBULIN 3.1 06/26/2008 08:45 AM    AGRATIO 1.4 06/26/2008 08:45 AM       Lab Results   Component Value Date/Time    CHOLSTRLTOT 198 08/07/2024 1617    TRIGLYCERIDE 389 (H) 08/07/2024 1617    HDL 44 08/07/2024 1617    LDL 76 08/07/2024 1617       No results found for: \"MICROALBCALC\", \"MALBCRT\", \"MALBEXCR\", \"RWIDNN32\", \"MICROALBUR\", \"MICRALB\", \"UMICROALBUM\", \"MICROALBTIM\"     No results found for: \"TSHULTRASEN\"  No results found for: \"FREEDIR\"  No results found for: \"FREET3\"  No results found for: \"THYSTIMIG\"      ASSESSMENT/PLAN:     1. Type 2 diabetes mellitus with hyperglycemia, with long-term current use of insulin (HCC)  Unstable  Medicaton:  Farxiga 10 mg daily - continue  Metformin 2000 mg daily - continue  Glargine 28 units at bedtime - increase to 30 " units at bedtime. Patient understands to titrate up by 1 unit for blood sugars >150 3 am consecutively.   Trulicity 0.75 mg weekly - start  Side effects of medication discussed with patient.   Patient understands proper administration of Trulicity.   I will resent for moises 3.   Recommend diet low in fats and carbs  Recommend 150mins/activity daily  I will refer patient to podiatry for further evaluation of bilateral feet.   - POCT Hemoglobin A1C  - Dulaglutide (TRULICITY) 0.75 MG/0.5ML Solution Auto-injector; Inject 1 Pen under the skin every 7 days. 4 pens per month  Dispense: 6 mL; Refill: 3  - Referral to Podiatry  - Continuous Glucose Sensor (FREESTYLE MOISES 3 SENSOR) Misc; 1 Each every 14 days.  Dispense: 6 Each; Refill: 3  - Comp Metabolic Panel; Future  - MICROALBUMIN CREAT RATIO URINE; Future  - TSH; Future  - FREE THYROXINE; Future    2. Encounter for vitamin deficiency screening  I will get additional blood work for further evaluation.   - VITAMIN D,25 HYDROXY (DEFICIENCY); Future     Return in about 3 months (around 2/11/2025). Patient will have blood work done prior to follow up in 3 months.     I spent approximately 55 minutes with the patient face-to-face more than 50% of which time was spent on counseling on the diagnosis and treatment and review of her risks and prognosis.  I discussed current status, physical exam findings, and plan of care.  Answered all of her questions.  The patient understands and agrees with the treatment plan.     Thank you kindly for allowing me to participate in the diabetes care plan for this patient.    González Bhakta, JESSICA   11/11/24    CC:   Gerardo Rodriguez D.O.

## 2024-11-14 ENCOUNTER — TELEPHONE (OUTPATIENT)
Dept: ENDOCRINOLOGY | Facility: MEDICAL CENTER | Age: 49
End: 2024-11-14
Payer: COMMERCIAL

## 2024-11-14 NOTE — TELEPHONE ENCOUNTER
Prior Authorization for Trulicity 0.75mg/0.5mL Sopn has been APPROVED  Prior Authorization reference number: [id number]  Effective dates: 11/14/2024-11/14/2025  Copay: $0  Is patient eligible to fill with [facility]? Yes- only for 1 month supply  Next Steps: Called patient @ [phone] and advised him of approval. He is filling his prescription at preferred pharmacy: [id number].    Thank you,  Magdalena Mclaughlin, [facility]  Endocrinology Pharmacy Coordinator

## 2024-11-14 NOTE — TELEPHONE ENCOUNTER
Received [employer] request via  [employer] for Trulicity 0.75mg/0.5mL Sopn. (Quantity:6mL, Day Supply:84)  Insurance: [place] Medicaid/[employer]  Member ID: [id number]  BIN: [id number]  PCN: [id number]  Group: [id number]   Ran Test claim via [employer] medication Rejects stating prior authorization is required.    Prior Authorization has been submitted via Cover My Meds: Key ([id number])  Will follow up in 24-48 business hours.     Thank you,  Magdalena Mclaughlin, [facility]  Endocrinology Pharmacy Coordinator

## 2024-11-23 DIAGNOSIS — E11.69 TYPE 2 DIABETES MELLITUS WITH OTHER SPECIFIED COMPLICATION, UNSPECIFIED WHETHER LONG TERM INSULIN USE (HCC): ICD-10-CM

## 2024-11-25 NOTE — TELEPHONE ENCOUNTER
Received request via: Pharmacy    Was the patient seen in the last year in this department? Yes    Does the patient have an active prescription (recently filled or refills available) for medication(s) requested? No    Pharmacy Name: [facility]    Does the patient have nursing home Plus and need 100-day supply? (This applies to ALL medications) Patient does not have SCP

## 2024-12-03 DIAGNOSIS — F41.1 GAD (GENERALIZED ANXIETY DISORDER): ICD-10-CM

## 2024-12-03 DIAGNOSIS — G47.00 INSOMNIA, UNSPECIFIED TYPE: ICD-10-CM

## 2024-12-03 RX ORDER — BUSPIRONE HYDROCHLORIDE 10 MG/1
10 TABLET ORAL DAILY
Qty: 90 TABLET | Refills: 0 | Status: SHIPPED | OUTPATIENT
Start: 2024-12-03

## 2024-12-03 RX ORDER — TRAZODONE HYDROCHLORIDE 100 MG/1
100 TABLET ORAL EVERY EVENING
Qty: 90 TABLET | Refills: 0 | Status: SHIPPED | OUTPATIENT
Start: 2024-12-03

## 2024-12-03 RX ORDER — CYCLOBENZAPRINE HCL 5 MG
5 TABLET ORAL 2 TIMES DAILY PRN
Qty: 30 TABLET | Refills: 0 | Status: SHIPPED | OUTPATIENT
Start: 2024-12-03

## 2024-12-03 NOTE — TELEPHONE ENCOUNTER
Received request via: Pharmacy    Was the patient seen in the last year in this department? Yes    Does the patient have an active prescription (recently filled or refills available) for medication(s) requested? No, Trazadone is too early, please deny.     Pharmacy Name:   [facility] [address] [place] [address], [place] [address] [address]  Phone: [phone] Fax: [phone]    Does the patient have half-way Plus and need 100-day supply? (This applies to ALL medications) Patient does not have SCP

## 2024-12-09 DIAGNOSIS — E11.65 TYPE 2 DIABETES MELLITUS WITH HYPERGLYCEMIA, WITH LONG-TERM CURRENT USE OF INSULIN (HCC): ICD-10-CM

## 2024-12-09 DIAGNOSIS — F41.1 GAD (GENERALIZED ANXIETY DISORDER): ICD-10-CM

## 2024-12-09 DIAGNOSIS — Z79.4 TYPE 2 DIABETES MELLITUS WITH HYPERGLYCEMIA, WITH LONG-TERM CURRENT USE OF INSULIN (HCC): ICD-10-CM

## 2024-12-10 RX ORDER — BUSPIRONE HYDROCHLORIDE 10 MG/1
10 TABLET ORAL DAILY
Qty: 90 TABLET | Refills: 0 | OUTPATIENT
Start: 2024-12-10

## 2024-12-10 RX ORDER — CYCLOBENZAPRINE HCL 5 MG
5 TABLET ORAL 2 TIMES DAILY PRN
Qty: 30 TABLET | Refills: 0 | OUTPATIENT
Start: 2024-12-10

## 2024-12-10 RX ORDER — DULAGLUTIDE 0.75 MG/.5ML
1 INJECTION, SOLUTION SUBCUTANEOUS
Qty: 2 ML | Refills: 3 | Status: SHIPPED | OUTPATIENT
Start: 2024-12-10

## 2024-12-13 DIAGNOSIS — I10 HYPERTENSION, UNSPECIFIED TYPE: ICD-10-CM

## 2024-12-13 NOTE — TELEPHONE ENCOUNTER
Received request via: Pharmacy    Was the patient seen in the last year in this department? Yes    Does the patient have an active prescription (recently filled or refills available) for medication(s) requested? No    Pharmacy Name: [facility]    Does the patient have long-term Plus and need 100-day supply? (This applies to ALL medications) Patient does not have SCP

## 2024-12-18 RX ORDER — LISINOPRIL AND HYDROCHLOROTHIAZIDE 20; 25 MG/1; MG/1
1 TABLET ORAL DAILY
Qty: 90 TABLET | Refills: 0 | Status: SHIPPED | OUTPATIENT
Start: 2024-12-18